# Patient Record
Sex: FEMALE | Race: WHITE | NOT HISPANIC OR LATINO | Employment: UNEMPLOYED | URBAN - METROPOLITAN AREA
[De-identification: names, ages, dates, MRNs, and addresses within clinical notes are randomized per-mention and may not be internally consistent; named-entity substitution may affect disease eponyms.]

---

## 2018-05-07 ENCOUNTER — HOSPITAL ENCOUNTER (EMERGENCY)
Facility: HOSPITAL | Age: 23
Discharge: HOME | End: 2018-05-07
Attending: EMERGENCY MEDICINE
Payer: MEDICAID

## 2018-05-07 VITALS
OXYGEN SATURATION: 98 % | SYSTOLIC BLOOD PRESSURE: 135 MMHG | HEIGHT: 67 IN | WEIGHT: 142 LBS | BODY MASS INDEX: 22.29 KG/M2 | DIASTOLIC BLOOD PRESSURE: 88 MMHG | TEMPERATURE: 98 F | HEART RATE: 81 BPM | RESPIRATION RATE: 18 BRPM

## 2018-05-07 DIAGNOSIS — T70.0XXA OTITIC BAROTRAUMA, INITIAL ENCOUNTER: Primary | ICD-10-CM

## 2018-05-07 PROCEDURE — 63600175 PHARM REV CODE 636 W HCPCS: Performed by: EMERGENCY MEDICINE

## 2018-05-07 PROCEDURE — 99283 EMERGENCY DEPT VISIT LOW MDM: CPT | Mod: 25

## 2018-05-07 PROCEDURE — 96372 THER/PROPH/DIAG INJ SC/IM: CPT

## 2018-05-07 PROCEDURE — 25000003 PHARM REV CODE 250: Performed by: EMERGENCY MEDICINE

## 2018-05-07 RX ORDER — DEXAMETHASONE SODIUM PHOSPHATE 100 MG/10ML
10 INJECTION INTRAMUSCULAR; INTRAVENOUS
Status: COMPLETED | OUTPATIENT
Start: 2018-05-07 | End: 2018-05-07

## 2018-05-07 RX ORDER — HYDROCODONE BITARTRATE AND ACETAMINOPHEN 5; 325 MG/1; MG/1
1 TABLET ORAL
Status: COMPLETED | OUTPATIENT
Start: 2018-05-07 | End: 2018-05-07

## 2018-05-07 RX ORDER — DEXAMETHASONE SODIUM PHOSPHATE 100 MG/10ML
4 INJECTION INTRAMUSCULAR; INTRAVENOUS
Status: DISCONTINUED | OUTPATIENT
Start: 2018-05-07 | End: 2018-05-07

## 2018-05-07 RX ORDER — PSEUDOEPHEDRINE HCL 30 MG
30-60 TABLET ORAL EVERY 6 HOURS PRN
Qty: 48 TABLET | Refills: 0 | Status: SHIPPED | OUTPATIENT
Start: 2018-05-07 | End: 2018-05-14

## 2018-05-07 RX ADMIN — DEXAMETHASONE SODIUM PHOSPHATE 10 MG: 10 INJECTION INTRAMUSCULAR; INTRAVENOUS at 05:05

## 2018-05-07 RX ADMIN — HYDROCODONE BITARTRATE AND ACETAMINOPHEN 1 TABLET: 5; 325 TABLET ORAL at 05:05

## 2018-05-07 NOTE — DISCHARGE INSTRUCTIONS
In ER   Dexamethasone 10mg IM x 1 / Norco x 1 in ER    Rx Sudafed  Over the counter Afrin nasal spray  Over the counter NSAID for pain     See Dr Davis in am for repeat evaluation

## 2018-05-08 NOTE — ED PROVIDER NOTES
Encounter Date: 5/7/2018       History     Chief Complaint   Patient presents with    Otalgia     bilateral, x1 hour     Bilateral ear pain without otorrhea following jumping from 20 ft into water while holding nose.     No prior ear abn            Review of patient's allergies indicates:  No Known Allergies  History reviewed. No pertinent past medical history.  Past Surgical History:   Procedure Laterality Date    abdominal lap      TUBAL LIGATION       History reviewed. No pertinent family history.  Social History   Substance Use Topics    Smoking status: Current Every Day Smoker    Smokeless tobacco: Not on file    Alcohol use No     Review of Systems   HENT: Positive for congestion, ear pain, hearing loss, rhinorrhea and sinus pressure. Negative for ear discharge, facial swelling, nosebleeds, postnasal drip, sinus pain, sore throat, tinnitus, trouble swallowing and voice change.    Skin: Negative.    Hematological: Negative for adenopathy.   All other systems reviewed and are negative.      Physical Exam     Initial Vitals [05/07/18 1639]   BP Pulse Resp Temp SpO2   135/88 81 18 98.1 °F (36.7 °C) 98 %      MAP       103.67         Physical Exam    Nursing note and vitals reviewed.  Constitutional: She appears well-developed and well-nourished. She is not diaphoretic. No distress.   HENT:   Right Ear: External ear normal. No drainage, swelling or tenderness. Tympanic membrane is not injected, not scarred, not perforated, not erythematous, not retracted and not bulging. There is hemotympanum. Decreased hearing is noted.   Left Ear: External ear normal. No drainage. Tympanic membrane is not injected, not scarred, not perforated, not erythematous, not retracted and not bulging. There is hemotympanum. Decreased hearing is noted.   Both TMs appear to have ecchymotic appearance without gross distortion of landmarks and no obvious perforation              ED Course   Procedures  Labs Reviewed - No data to  display          Medical Decision Making:   Otic barotrauma   Analgesics  Steroid IM  Oral decongestant  Nasal decongestant   F/u ENT in am for re-eval.                       Clinical Impression:   The encounter diagnosis was Otitic barotrauma, initial encounter.                           Sammy Braga MD  05/08/18 2075

## 2018-05-14 ENCOUNTER — LAB VISIT (OUTPATIENT)
Dept: LAB | Facility: HOSPITAL | Age: 23
End: 2018-05-14
Attending: OBSTETRICS & GYNECOLOGY
Payer: MEDICAID

## 2018-05-14 DIAGNOSIS — R10.2 ADNEXAL TENDERNESS, RIGHT: Primary | ICD-10-CM

## 2018-05-14 LAB
ABO + RH BLD: NORMAL
ANION GAP SERPL CALC-SCNC: 9 MMOL/L
APTT BLDCRRT: 24.8 SEC
B-HCG UR QL: NEGATIVE
BILIRUB UR QL STRIP: NEGATIVE
BLD GP AB SCN CELLS X3 SERPL QL: NORMAL
BUN SERPL-MCNC: 10 MG/DL
CALCIUM SERPL-MCNC: 9.1 MG/DL
CHLORIDE SERPL-SCNC: 103 MMOL/L
CLARITY UR: CLEAR
CO2 SERPL-SCNC: 24 MMOL/L
COLOR UR: YELLOW
CREAT SERPL-MCNC: 0.6 MG/DL
ERYTHROCYTE [DISTWIDTH] IN BLOOD BY AUTOMATED COUNT: 12.6 %
EST. GFR  (AFRICAN AMERICAN): >60 ML/MIN/1.73 M^2
EST. GFR  (NON AFRICAN AMERICAN): >60 ML/MIN/1.73 M^2
GLUCOSE SERPL-MCNC: 88 MG/DL
GLUCOSE UR QL STRIP: NEGATIVE
HCT VFR BLD AUTO: 40.4 %
HGB BLD-MCNC: 13.6 G/DL
HGB UR QL STRIP: NEGATIVE
INR PPP: 1.2
KETONES UR QL STRIP: NEGATIVE
LEUKOCYTE ESTERASE UR QL STRIP: NEGATIVE
MCH RBC QN AUTO: 29.2 PG
MCHC RBC AUTO-ENTMCNC: 33.7 G/DL
MCV RBC AUTO: 87 FL
NITRITE UR QL STRIP: NEGATIVE
PH UR STRIP: 6 [PH] (ref 5–8)
PLATELET # BLD AUTO: 236 K/UL
PMV BLD AUTO: 10.4 FL
POTASSIUM SERPL-SCNC: 3.8 MMOL/L
PROT UR QL STRIP: NEGATIVE
PROTHROMBIN TIME: 13.4 SEC
RBC # BLD AUTO: 4.66 M/UL
SODIUM SERPL-SCNC: 136 MMOL/L
SP GR UR STRIP: 1.02 (ref 1–1.03)
URN SPEC COLLECT METH UR: NORMAL
UROBILINOGEN UR STRIP-ACNC: NEGATIVE EU/DL
WBC # BLD AUTO: 11.23 K/UL

## 2018-05-14 PROCEDURE — 81003 URINALYSIS AUTO W/O SCOPE: CPT

## 2018-05-14 PROCEDURE — 85730 THROMBOPLASTIN TIME PARTIAL: CPT

## 2018-05-14 PROCEDURE — 81025 URINE PREGNANCY TEST: CPT

## 2018-05-14 PROCEDURE — 85610 PROTHROMBIN TIME: CPT

## 2018-05-14 PROCEDURE — 80048 BASIC METABOLIC PNL TOTAL CA: CPT

## 2018-05-14 PROCEDURE — 85027 COMPLETE CBC AUTOMATED: CPT

## 2018-05-14 PROCEDURE — 86901 BLOOD TYPING SEROLOGIC RH(D): CPT

## 2018-05-16 ENCOUNTER — HOSPITAL ENCOUNTER (OUTPATIENT)
Facility: HOSPITAL | Age: 23
Discharge: HOME | End: 2018-05-18
Attending: OBSTETRICS & GYNECOLOGY | Admitting: OBSTETRICS & GYNECOLOGY
Payer: MEDICAID

## 2018-05-16 ENCOUNTER — ANESTHESIA EVENT (OUTPATIENT)
Dept: SURGERY | Facility: HOSPITAL | Age: 23
End: 2018-05-16
Payer: MEDICAID

## 2018-05-16 ENCOUNTER — ANESTHESIA (OUTPATIENT)
Dept: SURGERY | Facility: HOSPITAL | Age: 23
End: 2018-05-16
Payer: MEDICAID

## 2018-05-16 DIAGNOSIS — R10.2 PELVIC PAIN: Primary | ICD-10-CM

## 2018-05-16 PROBLEM — N94.6 DYSMENORRHEA: Status: ACTIVE | Noted: 2018-05-16

## 2018-05-16 PROBLEM — N94.10 DYSPAREUNIA, FEMALE: Status: ACTIVE | Noted: 2018-05-16

## 2018-05-16 PROBLEM — F17.200 TOBACCO USE DISORDER: Status: ACTIVE | Noted: 2018-05-16

## 2018-05-16 PROBLEM — N92.1 MENOMETRORRHAGIA: Status: ACTIVE | Noted: 2018-05-16

## 2018-05-16 PROCEDURE — 00944 ANES VAG PX VAG HYSTERECTOMY: CPT | Performed by: OBSTETRICS & GYNECOLOGY

## 2018-05-16 PROCEDURE — 71000033 HC RECOVERY, INTIAL HOUR: Performed by: OBSTETRICS & GYNECOLOGY

## 2018-05-16 PROCEDURE — 96361 HYDRATE IV INFUSION ADD-ON: CPT | Performed by: OBSTETRICS & GYNECOLOGY

## 2018-05-16 PROCEDURE — 63600175 PHARM REV CODE 636 W HCPCS

## 2018-05-16 PROCEDURE — 37000008 HC ANESTHESIA 1ST 15 MINUTES: Performed by: OBSTETRICS & GYNECOLOGY

## 2018-05-16 PROCEDURE — 96361 HYDRATE IV INFUSION ADD-ON: CPT

## 2018-05-16 PROCEDURE — 37000009 HC ANESTHESIA EA ADD 15 MINS: Performed by: OBSTETRICS & GYNECOLOGY

## 2018-05-16 PROCEDURE — 96376 TX/PRO/DX INJ SAME DRUG ADON: CPT | Mod: 59

## 2018-05-16 PROCEDURE — 63600175 PHARM REV CODE 636 W HCPCS: Performed by: NURSE ANESTHETIST, CERTIFIED REGISTERED

## 2018-05-16 PROCEDURE — 96374 THER/PROPH/DIAG INJ IV PUSH: CPT | Mod: 59

## 2018-05-16 PROCEDURE — 25000003 PHARM REV CODE 250: Performed by: OBSTETRICS & GYNECOLOGY

## 2018-05-16 PROCEDURE — 88307 TISSUE EXAM BY PATHOLOGIST: CPT | Mod: 26,,, | Performed by: PATHOLOGY

## 2018-05-16 PROCEDURE — S4991 NICOTINE PATCH NONLEGEND: HCPCS | Performed by: OBSTETRICS & GYNECOLOGY

## 2018-05-16 PROCEDURE — 88307 TISSUE EXAM BY PATHOLOGIST: CPT | Performed by: PATHOLOGY

## 2018-05-16 PROCEDURE — 36000708 HC OR TIME LEV III 1ST 15 MIN: Performed by: OBSTETRICS & GYNECOLOGY

## 2018-05-16 PROCEDURE — 96376 TX/PRO/DX INJ SAME DRUG ADON: CPT | Performed by: OBSTETRICS & GYNECOLOGY

## 2018-05-16 PROCEDURE — 36000709 HC OR TIME LEV III EA ADD 15 MIN: Performed by: OBSTETRICS & GYNECOLOGY

## 2018-05-16 PROCEDURE — 63600175 PHARM REV CODE 636 W HCPCS: Performed by: OBSTETRICS & GYNECOLOGY

## 2018-05-16 PROCEDURE — G0378 HOSPITAL OBSERVATION PER HR: HCPCS

## 2018-05-16 PROCEDURE — 25000003 PHARM REV CODE 250: Performed by: NURSE ANESTHETIST, CERTIFIED REGISTERED

## 2018-05-16 PROCEDURE — 99900035 HC TECH TIME PER 15 MIN (STAT)

## 2018-05-16 PROCEDURE — 96375 TX/PRO/DX INJ NEW DRUG ADDON: CPT | Mod: 59

## 2018-05-16 PROCEDURE — 94799 UNLISTED PULMONARY SVC/PX: CPT

## 2018-05-16 PROCEDURE — 96374 THER/PROPH/DIAG INJ IV PUSH: CPT | Mod: 59 | Performed by: OBSTETRICS & GYNECOLOGY

## 2018-05-16 PROCEDURE — 63600175 PHARM REV CODE 636 W HCPCS: Performed by: ANESTHESIOLOGY

## 2018-05-16 PROCEDURE — 96375 TX/PRO/DX INJ NEW DRUG ADDON: CPT | Performed by: OBSTETRICS & GYNECOLOGY

## 2018-05-16 RX ORDER — MORPHINE SULFATE 4 MG/ML
2 INJECTION, SOLUTION INTRAMUSCULAR; INTRAVENOUS EVERY 5 MIN PRN
Status: DISCONTINUED | OUTPATIENT
Start: 2018-05-16 | End: 2018-05-16 | Stop reason: HOSPADM

## 2018-05-16 RX ORDER — MORPHINE SULFATE 4 MG/ML
INJECTION, SOLUTION INTRAMUSCULAR; INTRAVENOUS
Status: COMPLETED
Start: 2018-05-16 | End: 2018-05-16

## 2018-05-16 RX ORDER — GLYCOPYRROLATE 0.2 MG/ML
INJECTION INTRAMUSCULAR; INTRAVENOUS
Status: DISCONTINUED | OUTPATIENT
Start: 2018-05-16 | End: 2018-05-16

## 2018-05-16 RX ORDER — ONDANSETRON 2 MG/ML
4 INJECTION INTRAMUSCULAR; INTRAVENOUS DAILY PRN
Status: DISCONTINUED | OUTPATIENT
Start: 2018-05-16 | End: 2018-05-16 | Stop reason: HOSPADM

## 2018-05-16 RX ORDER — SODIUM CHLORIDE, SODIUM LACTATE, POTASSIUM CHLORIDE, CALCIUM CHLORIDE 600; 310; 30; 20 MG/100ML; MG/100ML; MG/100ML; MG/100ML
INJECTION, SOLUTION INTRAVENOUS CONTINUOUS
Status: DISCONTINUED | OUTPATIENT
Start: 2018-05-16 | End: 2018-05-18 | Stop reason: HOSPADM

## 2018-05-16 RX ORDER — CEFAZOLIN SODIUM 2 G/50ML
2 SOLUTION INTRAVENOUS
Status: COMPLETED | OUTPATIENT
Start: 2018-05-16 | End: 2018-05-16

## 2018-05-16 RX ORDER — URINARY ANTISEPTIC ANTISPASMODIC 81.6; 40.8; 10.8; .12 MG/1; MG/1; MG/1; MG/1
TABLET ORAL
Status: DISCONTINUED | OUTPATIENT
Start: 2018-05-16 | End: 2018-05-16 | Stop reason: HOSPADM

## 2018-05-16 RX ORDER — DIPHENHYDRAMINE HCL 25 MG
25 CAPSULE ORAL EVERY 4 HOURS PRN
Status: DISCONTINUED | OUTPATIENT
Start: 2018-05-16 | End: 2018-05-18 | Stop reason: HOSPADM

## 2018-05-16 RX ORDER — MORPHINE SULFATE 4 MG/ML
8 INJECTION, SOLUTION INTRAMUSCULAR; INTRAVENOUS
Status: DISCONTINUED | OUTPATIENT
Start: 2018-05-16 | End: 2018-05-16

## 2018-05-16 RX ORDER — MIDAZOLAM HYDROCHLORIDE 1 MG/ML
INJECTION, SOLUTION INTRAMUSCULAR; INTRAVENOUS
Status: DISCONTINUED | OUTPATIENT
Start: 2018-05-16 | End: 2018-05-16

## 2018-05-16 RX ORDER — DIAZEPAM 5 MG/1
10 TABLET ORAL EVERY 6 HOURS PRN
Status: DISCONTINUED | OUTPATIENT
Start: 2018-05-16 | End: 2018-05-18 | Stop reason: HOSPADM

## 2018-05-16 RX ORDER — KETOROLAC TROMETHAMINE 30 MG/ML
30 INJECTION, SOLUTION INTRAMUSCULAR; INTRAVENOUS EVERY 6 HOURS
Status: DISCONTINUED | OUTPATIENT
Start: 2018-05-16 | End: 2018-05-17

## 2018-05-16 RX ORDER — ONDANSETRON 2 MG/ML
INJECTION INTRAMUSCULAR; INTRAVENOUS
Status: DISCONTINUED | OUTPATIENT
Start: 2018-05-16 | End: 2018-05-16

## 2018-05-16 RX ORDER — ONDANSETRON 4 MG/1
8 TABLET, ORALLY DISINTEGRATING ORAL EVERY 8 HOURS PRN
Status: DISCONTINUED | OUTPATIENT
Start: 2018-05-16 | End: 2018-05-18 | Stop reason: HOSPADM

## 2018-05-16 RX ORDER — ENOXAPARIN SODIUM 100 MG/ML
40 INJECTION SUBCUTANEOUS EVERY 24 HOURS
Status: DISCONTINUED | OUTPATIENT
Start: 2018-05-17 | End: 2018-05-18 | Stop reason: HOSPADM

## 2018-05-16 RX ORDER — CISATRACURIUM BESYLATE 2 MG/ML
INJECTION, SOLUTION INTRAVENOUS
Status: DISCONTINUED | OUTPATIENT
Start: 2018-05-16 | End: 2018-05-16

## 2018-05-16 RX ORDER — PROPOFOL 10 MG/ML
VIAL (ML) INTRAVENOUS
Status: DISCONTINUED | OUTPATIENT
Start: 2018-05-16 | End: 2018-05-16

## 2018-05-16 RX ORDER — LIDOCAINE HYDROCHLORIDE 10 MG/ML
1 INJECTION, SOLUTION EPIDURAL; INFILTRATION; INTRACAUDAL; PERINEURAL ONCE
Status: DISCONTINUED | OUTPATIENT
Start: 2018-05-16 | End: 2018-05-16 | Stop reason: HOSPADM

## 2018-05-16 RX ORDER — BUPROPION HYDROCHLORIDE 100 MG/1
100 TABLET ORAL 2 TIMES DAILY
Status: DISCONTINUED | OUTPATIENT
Start: 2018-05-16 | End: 2018-05-18 | Stop reason: HOSPADM

## 2018-05-16 RX ORDER — DIPHENHYDRAMINE HYDROCHLORIDE 50 MG/ML
12.5 INJECTION INTRAMUSCULAR; INTRAVENOUS
Status: DISCONTINUED | OUTPATIENT
Start: 2018-05-16 | End: 2018-05-16 | Stop reason: HOSPADM

## 2018-05-16 RX ORDER — ONDANSETRON 2 MG/ML
INJECTION INTRAMUSCULAR; INTRAVENOUS
Status: COMPLETED
Start: 2018-05-16 | End: 2018-05-16

## 2018-05-16 RX ORDER — MORPHINE SULFATE 4 MG/ML
8 INJECTION, SOLUTION INTRAMUSCULAR; INTRAVENOUS EVERY 4 HOURS PRN
Status: DISCONTINUED | OUTPATIENT
Start: 2018-05-16 | End: 2018-05-18 | Stop reason: HOSPADM

## 2018-05-16 RX ORDER — SUCCINYLCHOLINE CHLORIDE 20 MG/ML
INJECTION INTRAMUSCULAR; INTRAVENOUS
Status: DISCONTINUED | OUTPATIENT
Start: 2018-05-16 | End: 2018-05-16

## 2018-05-16 RX ORDER — IBUPROFEN 200 MG
1 TABLET ORAL DAILY
Status: DISCONTINUED | OUTPATIENT
Start: 2018-05-16 | End: 2018-05-18 | Stop reason: HOSPADM

## 2018-05-16 RX ORDER — DEXAMETHASONE SODIUM PHOSPHATE 4 MG/ML
INJECTION, SOLUTION INTRA-ARTICULAR; INTRALESIONAL; INTRAMUSCULAR; INTRAVENOUS; SOFT TISSUE
Status: DISCONTINUED | OUTPATIENT
Start: 2018-05-16 | End: 2018-05-16

## 2018-05-16 RX ORDER — SIMETHICONE 80 MG
80 TABLET,CHEWABLE ORAL EVERY 4 HOURS PRN
Status: DISCONTINUED | OUTPATIENT
Start: 2018-05-16 | End: 2018-05-18 | Stop reason: HOSPADM

## 2018-05-16 RX ORDER — NEOSTIGMINE METHYLSULFATE 1 MG/ML
INJECTION, SOLUTION INTRAVENOUS
Status: DISCONTINUED | OUTPATIENT
Start: 2018-05-16 | End: 2018-05-16

## 2018-05-16 RX ORDER — MEPERIDINE HYDROCHLORIDE 50 MG/ML
INJECTION INTRAMUSCULAR; INTRAVENOUS; SUBCUTANEOUS
Status: DISCONTINUED | OUTPATIENT
Start: 2018-05-16 | End: 2018-05-16

## 2018-05-16 RX ADMIN — DEXAMETHASONE SODIUM PHOSPHATE 4 MG: 4 INJECTION, SOLUTION INTRAMUSCULAR; INTRAVENOUS at 08:05

## 2018-05-16 RX ADMIN — DIPHENHYDRAMINE HYDROCHLORIDE 25 MG: 25 CAPSULE ORAL at 11:05

## 2018-05-16 RX ADMIN — SODIUM CHLORIDE, POTASSIUM CHLORIDE, SODIUM LACTATE AND CALCIUM CHLORIDE: 600; 310; 30; 20 INJECTION, SOLUTION INTRAVENOUS at 11:05

## 2018-05-16 RX ADMIN — MORPHINE SULFATE 2 MG: 4 INJECTION INTRAVENOUS at 10:05

## 2018-05-16 RX ADMIN — SUCCINYLCHOLINE CHLORIDE 100 MG: 20 INJECTION, SOLUTION INTRAMUSCULAR; INTRAVENOUS at 08:05

## 2018-05-16 RX ADMIN — ONDANSETRON 4 MG: 2 INJECTION INTRAMUSCULAR; INTRAVENOUS at 10:05

## 2018-05-16 RX ADMIN — MORPHINE SULFATE 8 MG: 4 INJECTION INTRAVENOUS at 12:05

## 2018-05-16 RX ADMIN — GLYCOPYRROLATE 0.4 MG: 0.2 INJECTION INTRAMUSCULAR; INTRAVENOUS at 09:05

## 2018-05-16 RX ADMIN — BUPROPION HYDROCHLORIDE 100 MG: 100 TABLET, FILM COATED ORAL at 09:05

## 2018-05-16 RX ADMIN — MORPHINE SULFATE 8 MG: 4 INJECTION INTRAVENOUS at 03:05

## 2018-05-16 RX ADMIN — MEPERIDINE HYDROCHLORIDE 25 MG: 50 INJECTION INTRAMUSCULAR; INTRAVENOUS; SUBCUTANEOUS at 08:05

## 2018-05-16 RX ADMIN — SODIUM CHLORIDE, POTASSIUM CHLORIDE, SODIUM LACTATE AND CALCIUM CHLORIDE: 600; 310; 30; 20 INJECTION, SOLUTION INTRAVENOUS at 09:05

## 2018-05-16 RX ADMIN — NICOTINE 1 PATCH: 14 PATCH, EXTENDED RELEASE TRANSDERMAL at 11:05

## 2018-05-16 RX ADMIN — NEOSTIGMINE METHYLSULFATE 3 MG: 1 INJECTION INTRAVENOUS at 09:05

## 2018-05-16 RX ADMIN — SODIUM CHLORIDE, POTASSIUM CHLORIDE, SODIUM LACTATE AND CALCIUM CHLORIDE: 600; 310; 30; 20 INJECTION, SOLUTION INTRAVENOUS at 05:05

## 2018-05-16 RX ADMIN — ONDANSETRON 4 MG: 2 INJECTION INTRAMUSCULAR; INTRAVENOUS at 08:05

## 2018-05-16 RX ADMIN — SODIUM CHLORIDE, POTASSIUM CHLORIDE, SODIUM LACTATE AND CALCIUM CHLORIDE: 600; 310; 30; 20 INJECTION, SOLUTION INTRAVENOUS at 07:05

## 2018-05-16 RX ADMIN — KETOROLAC TROMETHAMINE 30 MG: 30 INJECTION, SOLUTION INTRAMUSCULAR; INTRAVENOUS at 11:05

## 2018-05-16 RX ADMIN — MORPHINE SULFATE 8 MG: 4 INJECTION INTRAVENOUS at 11:05

## 2018-05-16 RX ADMIN — CISATRACURIUM BESYLATE 4 MG: 2 INJECTION INTRAVENOUS at 08:05

## 2018-05-16 RX ADMIN — MORPHINE SULFATE 8 MG: 4 INJECTION INTRAVENOUS at 07:05

## 2018-05-16 RX ADMIN — PROPOFOL 70 MG: 10 INJECTION, EMULSION INTRAVENOUS at 09:05

## 2018-05-16 RX ADMIN — PROPOFOL 130 MG: 10 INJECTION, EMULSION INTRAVENOUS at 08:05

## 2018-05-16 RX ADMIN — KETOROLAC TROMETHAMINE 30 MG: 30 INJECTION, SOLUTION INTRAMUSCULAR; INTRAVENOUS at 06:05

## 2018-05-16 RX ADMIN — MIDAZOLAM HYDROCHLORIDE 2 MG: 1 INJECTION, SOLUTION INTRAMUSCULAR; INTRAVENOUS at 07:05

## 2018-05-16 RX ADMIN — CEFAZOLIN SODIUM 2000 MG: 2 SOLUTION INTRAVENOUS at 07:05

## 2018-05-16 RX ADMIN — BUPROPION HYDROCHLORIDE 100 MG: 100 TABLET, FILM COATED ORAL at 01:05

## 2018-05-16 RX ADMIN — MORPHINE SULFATE 8 MG: 4 INJECTION INTRAVENOUS at 02:05

## 2018-05-16 RX ADMIN — MEPERIDINE HYDROCHLORIDE 25 MG: 50 INJECTION INTRAMUSCULAR; INTRAVENOUS; SUBCUTANEOUS at 09:05

## 2018-05-16 NOTE — HPI
23 yo ..  menometro x 3 yr, worsening...btl .... dysp interuppts intercourse 8/10x.   Pelvic pain pelvic pressure, worse w activity.  dysm home bound 2d/mo....emb done wnl...

## 2018-05-16 NOTE — OP NOTE
DATE OF PROCEDURE:  05/16/2018.    SURGEON:  Woody Alex MD.    ANESTHESIOLOGIST:  Tutu.    ANESTHESIA:  General.    PREOPERATIVE DIAGNOSES:  Menorrhagia, dysmenorrhea, dyspareunia,  symptomatic uterine descensus, pelvic pain.    POSTOPERATIVE DIAGNOSES:  Menorrhagia, dysmenorrhea, dyspareunia,  symptomatic uterine descensus, pelvic pain.    PROCEDURE:  Total vaginal hysterectomy.    ESTIMATED BLOOD LOSS:  300 mL.    PROCEDURE IN DETAIL:  After appropriate consents were obtained, the patient  was taken to the Operating Room and given general anesthesia through  endotracheal intubation and placed in ankle stirrups, prepped and draped in  usual fashion for vaginal surgery.  Frankel was placed into the bladder.   Bladder was drained, 120 mL of dilute methylene blue was instilled into the  bladder.  The Frankel was clamped.  Examination under anesthesia revealed  first-degree prolapse.  No adnexal masses were palpated.  Weighted speculum  placed in the posterior vaginal vault, tumor tenaculum grasped the  anterior, posterior lip of the cervix.  A transcervical vaginal incision  was made anteriorly.  The anterior cul-de-sac was entered by sharp  dissection.  Bladder pillars were singly clamped, cut, and sutured with 0  Vicryl.  The vaginal tissue between the uterosacral ligaments, posterior to  the cervix was then grasped and incised.  Posterior cul-de-sac was entered.   Uterosacral ligaments were doubly clamped, cut and sutured with 0 Vicryl  transfixed with 0 Vicryl and held.  The cardinal ligaments were doubly  clamped, cut, and sutured with 0 Vicryl.  Uterine arteries were doubly  clamped, cut, and sutured with 0 Vicryl.  The cervix was amputated.  Uterus  was flipped in a Rahel fashion.  Uteroovarian ligaments were doubly  clamped, cut, and sutured with 0 Vicryl and transfixed with 0 Vicryl.   Specimen was removed.  Tubes and ovaries appeared normal.  Hemostasis was  noted.  The anterior peritoneum was sutured to  the anterior vaginal cuff  with 2-0 Vicryl interrupted.  Posterior vaginal cuff was then closed with 0  Vicryl in running locking fashion.  The anterior and posterior vaginal cuff  were then sutured with 0 Vicryl interrupted.  The uterosacral ligaments  were then tied together.  The patient was extubated and taken to Recovery  Room in good condition.  Lap, needle, sponge, instrument and needle count  were correct x2.        DY/IN dd: 05/16/2018 09:34:20 (CDT)   td: 05/16/2018 12:27:12 (CDT)  Doc ID #[id number]   Job ID #[id number]    CC:

## 2018-05-16 NOTE — H&P
[facility]  Obstetrics & Gynecology  History & Physical    Patient Name: Heide Dockery  MRN: [id number]  Admission Date: 2018  Primary Care Provider: Primary Doctor No    Subjective:     Chief Complaint/Reason for Admission:     History of Present Illness:  23 yo ..  menometro x 3 yr, worsening...btl .... dysp interuppts intercourse 8/10x.   Pelvic pain pelvic pressure, worse w activity.  dysm home bound 2d/mo....emb done wnl...    No new subjective & objective note has been filed under this hospital service since the last note was generated.    Assessment/Plan:   [facility]  No new Assessment & Plan notes have been filed under this hospital service since the last note was generated.  Service: Obstetrics and Gynecology      Woody Alex MD  Obstetrics & Gynecology  [facility]

## 2018-05-16 NOTE — OR NURSING
Report called to OB floor. Patient resting with eyes closed. VSS patient on room air. No drainage noted on karla pad.

## 2018-05-16 NOTE — ANESTHESIA POSTPROCEDURE EVALUATION
"Anesthesia Post Evaluation    Patient: Heide Dockery    Procedure(s) Performed: Procedure(s) (LRB):  HYSTERECTOMY TOTAL VAGINAL (N/A)    Final Anesthesia Type: general  Patient location during evaluation: PACU  Patient participation: Yes- Able to Participate  Level of consciousness: awake and awake and alert  Post-procedure vital signs: reviewed and stable  Pain management: adequate  Airway patency: patent  PONV status at discharge: No PONV  Anesthetic complications: no      Cardiovascular status: blood pressure returned to baseline  Respiratory status: unassisted and spontaneous ventilation  Hydration status: euvolemic  Follow-up not needed.        Visit Vitals  /87 (Patient Position: Lying)   Pulse 68   Temp 36.6 °C (97.8 °F) (Oral)   Resp 16   Ht 5' 6" (1.676 m)   Wt 63.5 kg (140 lb)   LMP 04/06/2018   SpO2 99%   BMI 22.60 kg/m²       Pain/Carola Score: Pain Assessment Performed: Yes (5/16/2018 10:30 AM)  Presence of Pain: non-verbal indicators present (5/16/2018 10:30 AM)  Pain Rating Prior to Med Admin: 10 (5/16/2018 10:38 AM)  Carola Score: 9 (5/16/2018 10:30 AM)      "

## 2018-05-16 NOTE — NURSING
Dr. Alex called to verify Morphine 8mg IV q1h based of nursing judgement. Order modified to 8mg IV q4h as per MD order. Will continue to monitor and assess.

## 2018-05-16 NOTE — SUBJECTIVE & OBJECTIVE
Obstetric History     No data available        History reviewed. No pertinent past medical history.  Past Surgical History:   Procedure Laterality Date    abdominal lap      TUBAL LIGATION         No prescriptions prior to admission.       Review of patient's allergies indicates:  No Known Allergies     Family History     Problem Relation (Age of Onset)    No Known Problems Mother, Sister, Brother        Social History Main Topics    Smoking status: Current Every Day Smoker     Packs/day: 0.25     Years: 10.00    Smokeless tobacco: Current User    Alcohol use Not on file    Drug use: Unknown    Sexual activity: Not on file     Review of Systems   All other systems reviewed and are negative.     Objective:     Vital Signs (Most Recent):  Temp: 98 °F (36.7 °C) (05/16/18 0712)  Pulse: 80 (05/16/18 0712)  Resp: 20 (05/16/18 0712)  BP: 118/72 (05/16/18 0712)  SpO2: 97 % (05/16/18 0712) Vital Signs (24h Range):  Temp:  [98 °F (36.7 °C)] 98 °F (36.7 °C)  Pulse:  [80] 80  Resp:  [20] 20  SpO2:  [97 %] 97 %  BP: (118)/(72) 118/72     Weight: 63.5 kg (140 lb)  Body mass index is 22.6 kg/m².    Patient's last menstrual period was 04/06/2018.    Physical Exam:    HENT:   Head: Normocephalic.    Eyes: EOM are normal.    Neck: Normal range of motion.    Cardiovascular: Normal rate, regular rhythm and normal heart sounds.     Pulmonary/Chest: Breath sounds normal.        Abdominal: Soft.     Genitourinary: Vagina normal. Uterus is tender. Cervix is normal. Right adnexum displays tenderness. Left adnexum displays tenderness. Labial bartholins normal.               Skin: Skin is warm.    Psychiatric: She has a normal mood and affect.       Laboratory:  I have personallly reviewed all pertinent lab results from the last 24 hours.    Diagnostic Results:  Labs: Reviewed  reviewed

## 2018-05-16 NOTE — PLAN OF CARE
0831AM-16FRENCH EGAN INSERTED AT START OF PROCEDURE FOLLOWING PREP. 25ML OF CLEAR YELLOW NOTED.ANDERSON RN

## 2018-05-16 NOTE — H&P
[facility]  Obstetrics & Gynecology  History & Physical    Patient Name: Heide Dockery  MRN: [id number]  Admission Date: 2018  Primary Care Provider: Primary Doctor No    Subjective:     Chief Complaint/Reason for Admission: pelvic pain x 3 yr    History of Present Illness:  21 yo ..  menometro x 3 yr, worsening...btl .... dysp interuppts intercourse 8/10x.   Pelvic pain pelvic pressure, worse w activity.  dysm home bound 2d/mo....emb done wnl...        Obstetric History     No data available        History reviewed. No pertinent past medical history.  Past Surgical History:   Procedure Laterality Date    abdominal lap      TUBAL LIGATION         No prescriptions prior to admission.       Review of patient's allergies indicates:  No Known Allergies     Family History     Problem Relation (Age of Onset)    No Known Problems Mother, Sister, Brother        Social History Main Topics    Smoking status: Current Every Day Smoker     Packs/day: 0.25     Years: 10.00    Smokeless tobacco: Current User    Alcohol use Not on file    Drug use: Unknown    Sexual activity: Not on file     Review of Systems   All other systems reviewed and are negative.     Objective:     Vital Signs (Most Recent):  Temp: 98 °F (36.7 °C) (18 0712)  Pulse: 80 (18 0712)  Resp: 20 (18 0712)  BP: 118/72 (18 0712)  SpO2: 97 % (18 0712) Vital Signs (24h Range):  Temp:  [98 °F (36.7 °C)] 98 °F (36.7 °C)  Pulse:  [80] 80  Resp:  [20] 20  SpO2:  [97 %] 97 %  BP: (118)/(72) 118/72     Weight: 63.5 kg (140 lb)  Body mass index is 22.6 kg/m².    Patient's last menstrual period was 2018.    Physical Exam:    HENT:   Head: Normocephalic.    Eyes: EOM are normal.    Neck: Normal range of motion.    Cardiovascular: Normal rate, regular rhythm and normal heart sounds.     Pulmonary/Chest: Breath sounds normal.        Abdominal: Soft.     Genitourinary: Vagina normal. Uterus  is tender. Cervix is normal. Right adnexum displays tenderness. Left adnexum displays tenderness. Labial bartholins normal.               Skin: Skin is warm.    Psychiatric: She has a normal mood and affect.       Laboratory:  I have personallly reviewed all pertinent lab results from the last 24 hours.    Diagnostic Results:  Labs: Reviewed  reviewed    Assessment/Plan:     Tvh. Risks benefits and alternatives discussed  No new Assessment & Plan notes have been filed under this hospital service since the last note was generated.  Service: Obstetrics and Gynecology      Woody Alex MD  Obstetrics & Gynecology  [facility]

## 2018-05-16 NOTE — ANESTHESIA PREPROCEDURE EVALUATION
05/16/2018  Heide Dockery is a 23 y.o., female.    Anesthesia Evaluation    I have reviewed the Patient Summary Reports.    I have reviewed the Nursing Notes.   I have reviewed the Medications.     Review of Systems  Anesthesia Hx:  No problems with previous Anesthesia  Neg history of prior surgery. Denies Family Hx of Anesthesia complications.   Denies Personal Hx of Anesthesia complications.   Hematology/Oncology:  Hematology Normal   Oncology Normal     EENT/Dental:EENT/Dental Normal   Cardiovascular:  Cardiovascular Normal     Pulmonary:  Pulmonary Normal    Renal/:  Renal/ Normal     Hepatic/GI:  Hepatic/GI Normal    Musculoskeletal:  Musculoskeletal Normal    Neurological:  Neurology Normal    Endocrine:  Endocrine Normal    Dermatological:  Skin Normal    Psych:  Psychiatric Normal           Physical Exam  General:  Well nourished    Airway/Jaw/Neck:  AIRWAY FINDINGS: Normal      Eyes/Ears/Nose:  EYES/EARS/NOSE FINDINGS: Normal   Dental:  DENTAL FINDINGS: Normal   Chest/Lungs:  Chest/Lungs Clear    Heart/Vascular:  Heart Findings: Normal Heart murmur: negative Vascular Findings: Normal    Abdomen:  Abdomen Findings: Normal    Musculoskeletal:  Musculoskeletal Findings: Normal   Skin:  Skin Findings: Normal    Mental Status:  Mental Status Findings: Normal        Anesthesia Plan  Type of Anesthesia, risks & benefits discussed:  Anesthesia Type:  general  Patient's Preference:   Intra-op Monitoring Plan: standard ASA monitors  Intra-op Monitoring Plan Comments:   Post Op Pain Control Plan:   Post Op Pain Control Plan Comments:   Induction:   IV  Beta Blocker:  Patient is not currently on a Beta-Blocker (No further documentation required).       Informed Consent: Patient understands risks and agrees with Anesthesia plan.  Questions answered. Anesthesia consent signed with patient.  ASA Score: 1      Day of Surgery Review of History & Physical:    H&P update referred to the provider.         Ready For Surgery From Anesthesia Perspective.

## 2018-05-16 NOTE — PLAN OF CARE
Patient resting quietly with no signs of distress noted. VSS on room air. Kalin and SCD hose in place. 16 Icelandic Frankel in place to bedside drainage with seal intact, secured to right upper thigh with device, tubing intact and patent. Bladder is soft and non distended. Urine is noted in tubing with blue color, urine in drainage bag clear and yellow. Nataliia pad in place with no drainage noted. Disposable underwear in place. Skin is pink, warm, and dry. PIV intact with no redness or swelling at site. IV fluids infusing to gravity at KVO. Will continue to monitor.

## 2018-05-16 NOTE — HOSPITAL COURSE
Time out 5/16 0730  dysp  Luiza   Pp dysm  dysp  tvh  Tob cessation  hct 40/31  norco valium colace pnv wellb 100 mg bid nicoderm x 6 w

## 2018-05-16 NOTE — TRANSFER OF CARE
"Anesthesia Transfer of Care Note    Patient: Heide Dockery    Procedure(s) Performed: Procedure(s) (LRB):  HYSTERECTOMY TOTAL VAGINAL (N/A)    Patient location: PACU    Anesthesia Type: general    Transport from OR: Transported from OR on room air with adequate spontaneous ventilation    Post pain: adequate analgesia    Post assessment: no apparent anesthetic complications and tolerated procedure well    Post vital signs: stable    Level of consciousness: awake, alert and oriented    Nausea/Vomiting: no nausea/vomiting    Complications: none    Transfer of care protocol was followed      Last vitals:   Visit Vitals  /72 (BP Location: Right arm, Patient Position: Lying)   Pulse 80   Temp 36.7 °C (98 °F) (Oral)   Resp 20   Ht 5' 6" (1.676 m)   Wt 63.5 kg (140 lb)   LMP 04/06/2018   SpO2 97%   BMI 22.60 kg/m²     "

## 2018-05-16 NOTE — H&P
[facility]  Obstetrics & Gynecology  History & Physical    Patient Name: Heide Dockery  MRN: [id number]  Admission Date: 2018  Primary Care Provider: Primary Doctor No    Subjective:     Chief Complaint/Reason for Admission:     History of Present Illness:  23 yo ..  menometro x 3 yr, worsening...btl .... dysp interuppts intercourse 8/10x.   Pelvic pain pelvic pressure, worse w activity.  dysm home bound 2d/mo....emb done wnl...    No new subjective & objective note has been filed under this hospital service since the last note was generated.    Assessment/Plan:     No new Assessment & Plan notes have been filed under this hospital service since the last note was generated.  Service: Obstetrics and Gynecology      Woody Alex MD  Obstetrics & Gynecology  [facility]

## 2018-05-16 NOTE — BRIEF OP NOTE
[facility]  Operative Note     SUMMARY     Surgery Date: 5/16/2018       Pre-op Diagnosis:  Dyspareunia in female [N94.10]  Pain pelvic [R10.2]    Post-op Diagnosis:  Post-Op Diagnosis Codes:     * Dyspareunia in female [N94.10]     * Pain pelvic [R10.2]    Procedure(s) (LRB):  HYSTERECTOMY TOTAL VAGINAL (N/A)    Surgeon(s) and Role:     * Woody Alex MD - Primary      Estimated Blood Loss: 300 mL    Anesthesia:  General    Description of the findings of the procedure:  Dyspareunia in female [N94.10]  Pain pelvic [R10.2]           Procedure:   [facility]  Brief Operative Note    SUMMARY     Surgery Date: 5/16/2018     Surgeon(s) and Role:     * Woody Alex MD - Primary    Assisting Surgeon: None    Pre-op Diagnosis:  Dyspareunia in female [N94.10]  Pain pelvic [R10.2]    Post-op Diagnosis:  Post-Op Diagnosis Codes:     * Dyspareunia in female [N94.10]     * Pain pelvic [R10.2]    Procedure(s) (LRB):  HYSTERECTOMY TOTAL VAGINAL (N/A)    Anesthesia: General    Description of Procedure: tvh    Description of the findings of the procedure: nl tubes and ovaries    Estimated Blood Loss: 300 mL         Specimens:   Specimen (12h ago through future)    None

## 2018-05-17 LAB
ANION GAP SERPL CALC-SCNC: 4 MMOL/L
BASOPHILS # BLD AUTO: 0.02 K/UL
BASOPHILS NFR BLD: 0.2 %
BUN SERPL-MCNC: 7 MG/DL
CALCIUM SERPL-MCNC: 8.5 MG/DL
CHLORIDE SERPL-SCNC: 104 MMOL/L
CO2 SERPL-SCNC: 28 MMOL/L
CREAT SERPL-MCNC: 0.7 MG/DL
DIFFERENTIAL METHOD: ABNORMAL
EOSINOPHIL # BLD AUTO: 0.1 K/UL
EOSINOPHIL NFR BLD: 0.4 %
ERYTHROCYTE [DISTWIDTH] IN BLOOD BY AUTOMATED COUNT: 12.2 %
EST. GFR  (AFRICAN AMERICAN): >60 ML/MIN/1.73 M^2
EST. GFR  (NON AFRICAN AMERICAN): >60 ML/MIN/1.73 M^2
GLUCOSE SERPL-MCNC: 91 MG/DL
HCT VFR BLD AUTO: 31.3 %
HGB BLD-MCNC: 10.3 G/DL
IMM GRANULOCYTES # BLD AUTO: 0.05 K/UL
IMM GRANULOCYTES NFR BLD AUTO: 0.4 %
LYMPHOCYTES # BLD AUTO: 2.3 K/UL
LYMPHOCYTES NFR BLD: 19.8 %
MCH RBC QN AUTO: 28.9 PG
MCHC RBC AUTO-ENTMCNC: 32.9 G/DL
MCV RBC AUTO: 88 FL
MONOCYTES # BLD AUTO: 1 K/UL
MONOCYTES NFR BLD: 8.1 %
NEUTROPHILS # BLD AUTO: 8.3 K/UL
NEUTROPHILS NFR BLD: 71.1 %
NRBC BLD-RTO: 0 /100 WBC
PLATELET # BLD AUTO: 177 K/UL
PMV BLD AUTO: 10.5 FL
POTASSIUM SERPL-SCNC: 3.6 MMOL/L
RBC # BLD AUTO: 3.56 M/UL
SODIUM SERPL-SCNC: 136 MMOL/L
WBC # BLD AUTO: 11.74 K/UL

## 2018-05-17 PROCEDURE — 96361 HYDRATE IV INFUSION ADD-ON: CPT | Performed by: OBSTETRICS & GYNECOLOGY

## 2018-05-17 PROCEDURE — 80048 BASIC METABOLIC PNL TOTAL CA: CPT

## 2018-05-17 PROCEDURE — 96374 THER/PROPH/DIAG INJ IV PUSH: CPT

## 2018-05-17 PROCEDURE — 96376 TX/PRO/DX INJ SAME DRUG ADON: CPT

## 2018-05-17 PROCEDURE — 96376 TX/PRO/DX INJ SAME DRUG ADON: CPT | Performed by: OBSTETRICS & GYNECOLOGY

## 2018-05-17 PROCEDURE — 36415 COLL VENOUS BLD VENIPUNCTURE: CPT

## 2018-05-17 PROCEDURE — 96372 THER/PROPH/DIAG INJ SC/IM: CPT | Performed by: OBSTETRICS & GYNECOLOGY

## 2018-05-17 PROCEDURE — 96375 TX/PRO/DX INJ NEW DRUG ADDON: CPT

## 2018-05-17 PROCEDURE — G0378 HOSPITAL OBSERVATION PER HR: HCPCS

## 2018-05-17 PROCEDURE — 25000003 PHARM REV CODE 250: Performed by: OBSTETRICS & GYNECOLOGY

## 2018-05-17 PROCEDURE — 96361 HYDRATE IV INFUSION ADD-ON: CPT

## 2018-05-17 PROCEDURE — 85025 COMPLETE CBC W/AUTO DIFF WBC: CPT

## 2018-05-17 PROCEDURE — 63600175 PHARM REV CODE 636 W HCPCS: Performed by: OBSTETRICS & GYNECOLOGY

## 2018-05-17 PROCEDURE — 96372 THER/PROPH/DIAG INJ SC/IM: CPT

## 2018-05-17 PROCEDURE — S4991 NICOTINE PATCH NONLEGEND: HCPCS | Performed by: OBSTETRICS & GYNECOLOGY

## 2018-05-17 RX ORDER — OXYCODONE AND ACETAMINOPHEN 10; 325 MG/1; MG/1
1 TABLET ORAL EVERY 4 HOURS PRN
Status: DISCONTINUED | OUTPATIENT
Start: 2018-05-17 | End: 2018-05-18 | Stop reason: HOSPADM

## 2018-05-17 RX ORDER — DOCUSATE SODIUM 100 MG/1
200 CAPSULE, LIQUID FILLED ORAL DAILY PRN
Status: DISCONTINUED | OUTPATIENT
Start: 2018-05-17 | End: 2018-05-18 | Stop reason: HOSPADM

## 2018-05-17 RX ORDER — IBUPROFEN 200 MG
1 TABLET ORAL ONCE
Status: DISCONTINUED | OUTPATIENT
Start: 2018-05-17 | End: 2018-05-17

## 2018-05-17 RX ADMIN — SIMETHICONE CHEW TAB 80 MG 80 MG: 80 TABLET ORAL at 08:05

## 2018-05-17 RX ADMIN — DIPHENHYDRAMINE HYDROCHLORIDE 25 MG: 25 CAPSULE ORAL at 09:05

## 2018-05-17 RX ADMIN — DIAZEPAM 10 MG: 5 TABLET ORAL at 07:05

## 2018-05-17 RX ADMIN — BUPROPION HYDROCHLORIDE 100 MG: 100 TABLET, FILM COATED ORAL at 08:05

## 2018-05-17 RX ADMIN — NICOTINE 1 PATCH: 14 PATCH, EXTENDED RELEASE TRANSDERMAL at 09:05

## 2018-05-17 RX ADMIN — OXYCODONE AND ACETAMINOPHEN 1 TABLET: 10; 325 TABLET ORAL at 12:05

## 2018-05-17 RX ADMIN — BUPROPION HYDROCHLORIDE 100 MG: 100 TABLET, FILM COATED ORAL at 09:05

## 2018-05-17 RX ADMIN — DIPHENHYDRAMINE HYDROCHLORIDE 25 MG: 25 CAPSULE ORAL at 04:05

## 2018-05-17 RX ADMIN — OXYCODONE AND ACETAMINOPHEN 1 TABLET: 10; 325 TABLET ORAL at 03:05

## 2018-05-17 RX ADMIN — KETOROLAC TROMETHAMINE 30 MG: 30 INJECTION, SOLUTION INTRAMUSCULAR; INTRAVENOUS at 06:05

## 2018-05-17 RX ADMIN — SIMETHICONE CHEW TAB 80 MG 80 MG: 80 TABLET ORAL at 03:05

## 2018-05-17 RX ADMIN — ENOXAPARIN SODIUM 40 MG: 100 INJECTION SUBCUTANEOUS at 04:05

## 2018-05-17 RX ADMIN — MORPHINE SULFATE 8 MG: 4 INJECTION INTRAVENOUS at 04:05

## 2018-05-17 RX ADMIN — DOCUSATE SODIUM 200 MG: 100 CAPSULE, LIQUID FILLED ORAL at 09:05

## 2018-05-17 RX ADMIN — MORPHINE SULFATE 8 MG: 4 INJECTION INTRAVENOUS at 08:05

## 2018-05-17 RX ADMIN — OXYCODONE AND ACETAMINOPHEN 1 TABLET: 10; 325 TABLET ORAL at 07:05

## 2018-05-17 NOTE — PROGRESS NOTES
[facility]  Obstetrics & Gynecology  Progress Note    Patient Name: Heide Dockery  MRN: [id number]  Admission Date: 2018  Primary Care Provider: Primary Doctor No  Principal Problem: Pelvic pain    Subjective:     HPI:  23 yo ..  menometro x 3 yr, worsening...btl .... dysp interuppts intercourse 8/10x.   Pelvic pain pelvic pressure, worse w activity.  dysm home bound 2d/mo....emb done wnl...    Interval History: passing flatus    Scheduled Meds:   buPROPion  100 mg Oral BID    enoxaparin  40 mg Subcutaneous Daily    ketorolac  30 mg Intravenous Q6H    nicotine  1 patch Transdermal Daily     Continuous Infusions:   lactated ringers 125 mL/hr at 1836    lactated ringers 125 mL/hr at 18 2328     PRN Meds:diazePAM, diphenhydrAMINE, morphine, ondansetron, promethazine (PHENERGAN) IVPB, simethicone    Review of patient's allergies indicates:  No Known Allergies    Objective:     Vital Signs (Most Recent):  Temp: 98.3 °F (36.8 °C) (18)  Pulse: 69 (18)  Resp: 18 (18)  BP: (!) 102/55 (18)  SpO2: 99 % (18) Vital Signs (24h Range):  Temp:  [97.2 °F (36.2 °C)-98.3 °F (36.8 °C)] 98.3 °F (36.8 °C)  Pulse:  [62-84] 69  Resp:  [11-20] 18  SpO2:  [97 %-100 %] 99 %  BP: (101-133)/(55-90) 102/55     Weight: 63.5 kg (140 lb)  Body mass index is 22.6 kg/m².  Patient's last menstrual period was 2018.    I&O (Last 24H):    Intake/Output Summary (Last 24 hours) at 18 0832  Last data filed at 18 0600   Gross per 24 hour   Intake             5085 ml   Output             2900 ml   Net             2185 ml       Physical Exam:   Constitutional: She is oriented to person, place, and time. She appears well-developed and well-nourished.    HENT:   Head: Normocephalic and atraumatic.    Eyes: EOM are normal. Pupils are equal, round, and reactive to light.    Neck: Normal range of motion.    Cardiovascular: Normal  rate, regular rhythm and normal heart sounds.     Pulmonary/Chest: Effort normal and breath sounds normal.        Abdominal: Soft. Bowel sounds are normal.             Musculoskeletal: Normal range of motion.       Neurological: She is alert and oriented to person, place, and time.         Laboratory:  I have personallly reviewed all pertinent lab results from the last 24 hours.    Diagnostic Results:  Labs: Reviewed  reviewed    Assessment/Plan:     No notes have been filed under this hospital service.  Service: Obstetrics and Gynecology      Woody Alex MD  Obstetrics & Gynecology  [facility]

## 2018-05-17 NOTE — SUBJECTIVE & OBJECTIVE
Interval History: passing flatus    Scheduled Meds:   buPROPion  100 mg Oral BID    enoxaparin  40 mg Subcutaneous Daily    ketorolac  30 mg Intravenous Q6H    nicotine  1 patch Transdermal Daily     Continuous Infusions:   lactated ringers 125 mL/hr at 05/16/18 0736    lactated ringers 125 mL/hr at 05/16/18 2328     PRN Meds:diazePAM, diphenhydrAMINE, morphine, ondansetron, promethazine (PHENERGAN) IVPB, simethicone    Review of patient's allergies indicates:  No Known Allergies    Objective:     Vital Signs (Most Recent):  Temp: 98.3 °F (36.8 °C) (05/17/18 0731)  Pulse: 69 (05/17/18 0731)  Resp: 18 (05/17/18 0731)  BP: (!) 102/55 (05/17/18 0731)  SpO2: 99 % (05/17/18 0731) Vital Signs (24h Range):  Temp:  [97.2 °F (36.2 °C)-98.3 °F (36.8 °C)] 98.3 °F (36.8 °C)  Pulse:  [62-84] 69  Resp:  [11-20] 18  SpO2:  [97 %-100 %] 99 %  BP: (101-133)/(55-90) 102/55     Weight: 63.5 kg (140 lb)  Body mass index is 22.6 kg/m².  Patient's last menstrual period was 04/06/2018.    I&O (Last 24H):    Intake/Output Summary (Last 24 hours) at 05/17/18 0832  Last data filed at 05/17/18 0600   Gross per 24 hour   Intake             5085 ml   Output             2900 ml   Net             2185 ml       Physical Exam:   Constitutional: She is oriented to person, place, and time. She appears well-developed and well-nourished.    HENT:   Head: Normocephalic and atraumatic.    Eyes: EOM are normal. Pupils are equal, round, and reactive to light.    Neck: Normal range of motion.    Cardiovascular: Normal rate, regular rhythm and normal heart sounds.     Pulmonary/Chest: Effort normal and breath sounds normal.        Abdominal: Soft. Bowel sounds are normal.             Musculoskeletal: Normal range of motion.       Neurological: She is alert and oriented to person, place, and time.         Laboratory:  I have personallly reviewed all pertinent lab results from the last 24 hours.    Diagnostic Results:  Labs: Reviewed  reviewed

## 2018-05-17 NOTE — NURSING
"Sitting up in bed talking to multiple family members at bedside. Stated, "I took a really good nap. Now I am hungry". Lunch tray warmed. No distress noted at this time.   "

## 2018-05-17 NOTE — NURSING
SCD DEVICE REMOVED. EGAN CATH DC'ED PER POLICY. ADVISED PT CALL BEFORE SHE GETS UP TO BATHROOM. PROVIDED NO SLIP SOCKS, PT VERBALIZED UNDERSTANDING.

## 2018-05-17 NOTE — PLAN OF CARE
05/17/18 1030   Discharge Assessment   Assessment Type Discharge Planning Assessment   Confirmed/corrected address and phone number on facesheet? Yes   Assessment information obtained from? Patient   Expected Length of Stay (days) 2   Communicated expected length of stay with patient/caregiver yes   Prior to hospitilization cognitive status: Alert/Oriented   Prior to hospitalization functional status: Independent   Current cognitive status: Alert/Oriented   Current Functional Status: Independent   Lives With child(melissa), dependent   Able to Return to Prior Arrangements yes   Is patient able to care for self after discharge? Yes   Who are your caregiver(s) and their phone number(s)? Stephanie Posada mother [phone]   Patient's perception of discharge disposition home or selfcare   Readmission Within The Last 30 Days no previous admission in last 30 days   Patient currently being followed by outpatient case management? No   Patient currently receives any other outside agency services? No   Equipment Currently Used at Home none   Do you have any problems affording any of your prescribed medications? No   Is the patient taking medications as prescribed? (not on any medications at this time)   Does the patient have transportation home? Yes   Transportation Available family or friend will provide   Does the patient receive services at the [facility]? No   Discharge Plan A Home   Patient/Family In Agreement With Plan yes   Patient lives at home with her 3 children all under age of 4. She has a boyfriend & her mom helps her out. She denies any discharge needs.

## 2018-05-18 PROCEDURE — G0378 HOSPITAL OBSERVATION PER HR: HCPCS

## 2018-05-18 PROCEDURE — S4991 NICOTINE PATCH NONLEGEND: HCPCS | Performed by: OBSTETRICS & GYNECOLOGY

## 2018-05-18 PROCEDURE — 25000003 PHARM REV CODE 250: Performed by: OBSTETRICS & GYNECOLOGY

## 2018-05-18 RX ORDER — DIAZEPAM 10 MG/1
10 TABLET ORAL EVERY 8 HOURS PRN
Qty: 30 TABLET | Refills: 0 | Status: SHIPPED | OUTPATIENT
Start: 2018-05-18 | End: 2019-09-06

## 2018-05-18 RX ORDER — OXYCODONE AND ACETAMINOPHEN 10; 325 MG/1; MG/1
1 TABLET ORAL EVERY 6 HOURS PRN
Qty: 30 TABLET | Refills: 0 | Status: SHIPPED | OUTPATIENT
Start: 2018-05-18 | End: 2019-09-06

## 2018-05-18 RX ORDER — IBUPROFEN 200 MG
1 TABLET ORAL DAILY
Refills: 0 | COMMUNITY
Start: 2018-05-18 | End: 2019-09-06

## 2018-05-18 RX ORDER — BUPROPION HYDROCHLORIDE 100 MG/1
100 TABLET ORAL 2 TIMES DAILY
Qty: 60 TABLET | Refills: 11 | Status: SHIPPED | OUTPATIENT
Start: 2018-05-18 | End: 2019-09-06

## 2018-05-18 RX ORDER — DOCUSATE SODIUM 100 MG/1
200 CAPSULE, LIQUID FILLED ORAL DAILY PRN
Refills: 0 | COMMUNITY
Start: 2018-05-18 | End: 2019-09-06

## 2018-05-18 RX ADMIN — NICOTINE 1 PATCH: 14 PATCH, EXTENDED RELEASE TRANSDERMAL at 09:05

## 2018-05-18 RX ADMIN — OXYCODONE AND ACETAMINOPHEN 1 TABLET: 10; 325 TABLET ORAL at 12:05

## 2018-05-18 RX ADMIN — BUPROPION HYDROCHLORIDE 100 MG: 100 TABLET, FILM COATED ORAL at 09:05

## 2018-05-18 RX ADMIN — OXYCODONE AND ACETAMINOPHEN 1 TABLET: 10; 325 TABLET ORAL at 09:05

## 2018-05-18 RX ADMIN — OXYCODONE AND ACETAMINOPHEN 1 TABLET: 10; 325 TABLET ORAL at 05:05

## 2018-05-18 NOTE — DISCHARGE SUMMARY
[facility] - Post Partum  Obstetrics & Gynecology  Discharge Summary    Patient Name: Heide Dockery  MRN: [id number]  Admission Date: 2018  Hospital Length of Stay: 0 days  Discharge Date and Time:  2018 8:21 AM  Attending Physician: Woody Alex MD   Discharging Provider: Woody Alex MD  Primary Care Provider: Primary Doctor No    HPI:  23 yo ..  menometro x 3 yr, worsening...btl .... dysp interuppts intercourse 8/10x.   Pelvic pain pelvic pressure, worse w activity.  dysm home bound 2d/mo....emb done wnl...    Hospital Course:  Time out  0730  dysp  Luiza   Pp dysm  dysp  tvh  Tob cessation  hct   norco valium colace pnv wellb 100 mg bid nicoderm x 6 w    Procedure(s) (LRB):  HYSTERECTOMY TOTAL VAGINAL (N/A)         Significant Diagnostic Studies: Labs: All labs within the past 24 hours have been reviewed    Pending Diagnostic Studies:     None        Final Active Diagnoses:    Diagnosis Date Noted POA    PRINCIPAL PROBLEM:  Pelvic pain [R10.2] 2018 Yes    Menometrorrhagia [N92.1] 2018 Unknown    Dysmenorrhea [N94.6] 2018 Unknown    Dyspareunia, female [N94.10] 2018 Unknown    Tobacco use disorder [F17.200] 2018 Yes      Problems Resolved During this Admission:    Diagnosis Date Noted Date Resolved POA        Discharged Condition: good    Disposition: Home or Self Care    Follow Up:  Follow-up Information     Woody Alex MD In 1 week.    Specialty:  Obstetrics and Gynecology  Contact information:  [address] [place] [address] [phone]                 Patient Instructions:     Diet Adult Regular     Activity as tolerated     Lifting restrictions   Order Comments: Lift less than 20 lbs     Other restrictions (specify):   Order Comments: No sex x 6 wks     Notify your health care provider if you experience any of the following:  temperature >100.4     Notify your health care provider if you  experience any of the following:  severe uncontrolled pain     Notify your health care provider if you experience any of the following:  redness, tenderness, or signs of infection (pain, swelling, redness, odor or green/yellow discharge around incision site)     Notify your health care provider if you experience any of the following:  difficulty breathing or increased cough     Notify your health care provider if you experience any of the following:  severe persistent headache     Notify your health care provider if you experience any of the following:   Order Comments: Increased pain     Change dressing (specify)   Order Comments: If aquasel dressing, do not remove dressing. And you may shower with it on.  It will be removed in the office at one week.       Medications:  Reconciled Home Medications:      Medication List      START taking these medications    buPROPion 100 MG tablet  Commonly known as:  WELLBUTRIN  Take 1 tablet (100 mg total) by mouth 2 (two) times daily.     diazePAM 10 MG Tab  Commonly known as:  VALIUM  Take 1 tablet (10 mg total) by mouth every 8 (eight) hours as needed for Anxiety.     docusate sodium 100 MG capsule  Commonly known as:  COLACE  Take 2 capsules (200 mg total) by mouth daily as needed for Constipation.     nicotine 14 mg/24 hr  Commonly known as:  NICODERM CQ  Place 1 patch onto the skin once daily.     oxyCODONE-acetaminophen  mg per tablet  Commonly known as:  PERCOCET  Take 1 tablet by mouth every 6 (six) hours as needed.            Woody Alex MD  Obstetrics & Gynecology  [facility] - [facility] Partum

## 2018-05-18 NOTE — PLAN OF CARE
05/18/18 0836   Final Note   Assessment Type Final Discharge Note   Discharge Disposition Home   What phone number can be called within the next 1-3 days to see how you are doing after discharge? [phone]   Hospital Follow Up  Appt(s) scheduled? Yes   Discharge plans and expectations educations in teach back method with documentation complete? Yes   Follow up appointment given to patient. Denies any discharge needs.

## 2018-05-18 NOTE — NURSING
OBSERVED PT SLEEPING SOUNDLY WITH NORMAL RESP. AWAKENED FOR VITAL SIGNS, PT STATES PAIN IS 8/10. STATES WILL TAKE A PAIN PILL IF IT HAS BEEN TIME ENOUGH. PERCOCET GIVEN PO.

## 2018-05-18 NOTE — ED NOTES
PT IN BED IN ROOM 147, SIG. OTHER ALSO IN BED WITH HER. STATES STILL HAVING PAIN. LAYING QUIETLY WATCHING TV. OFFERED BENADRYL FOR REST, AGREEABLE. STATES FEELS CONSTIPATED, GAVE DOCUSATE. SIDERAILS UP CALL LIGHT IN REACH.

## 2018-06-11 VITALS
TEMPERATURE: 98 F | DIASTOLIC BLOOD PRESSURE: 59 MMHG | OXYGEN SATURATION: 98 % | HEIGHT: 66 IN | RESPIRATION RATE: 18 BRPM | BODY MASS INDEX: 22.5 KG/M2 | WEIGHT: 140 LBS | HEART RATE: 94 BPM | SYSTOLIC BLOOD PRESSURE: 128 MMHG

## 2019-04-04 DIAGNOSIS — S69.80XA: Primary | ICD-10-CM

## 2019-09-06 ENCOUNTER — OFFICE VISIT (OUTPATIENT)
Dept: SURGERY | Facility: CLINIC | Age: 24
End: 2019-09-06
Payer: MEDICAID

## 2019-09-06 VITALS
HEART RATE: 74 BPM | OXYGEN SATURATION: 100 % | SYSTOLIC BLOOD PRESSURE: 120 MMHG | HEIGHT: 66 IN | TEMPERATURE: 97 F | WEIGHT: 124.13 LBS | DIASTOLIC BLOOD PRESSURE: 61 MMHG | RESPIRATION RATE: 18 BRPM | BODY MASS INDEX: 19.95 KG/M2

## 2019-09-06 DIAGNOSIS — Z01.818 PRE-OP TESTING: ICD-10-CM

## 2019-09-06 DIAGNOSIS — R63.4 WEIGHT LOSS: Primary | ICD-10-CM

## 2019-09-06 DIAGNOSIS — L05.91 PILONIDAL CYST: ICD-10-CM

## 2019-09-06 PROCEDURE — 99204 OFFICE O/P NEW MOD 45 MIN: CPT | Mod: S$GLB,,, | Performed by: SURGERY

## 2019-09-06 PROCEDURE — 99204 PR OFFICE/OUTPT VISIT, NEW, LEVL IV, 45-59 MIN: ICD-10-PCS | Mod: S$GLB,,, | Performed by: SURGERY

## 2019-09-06 RX ORDER — LIDOCAINE HYDROCHLORIDE 10 MG/ML
1 INJECTION, SOLUTION EPIDURAL; INFILTRATION; INTRACAUDAL; PERINEURAL ONCE
Status: DISCONTINUED | OUTPATIENT
Start: 2019-09-06 | End: 2019-09-24 | Stop reason: HOSPADM

## 2019-09-06 RX ORDER — SODIUM CHLORIDE 9 MG/ML
INJECTION, SOLUTION INTRAVENOUS CONTINUOUS
Status: CANCELLED | OUTPATIENT
Start: 2019-09-06

## 2019-09-06 NOTE — LETTER
September 6, 2019      Tameka Dogulas, NP-C  [address]  [facility] [place] [address]           [facility] - General Surgery  [address] [facility] [place] [address]  Phone: [phone]  Fax: [phone]          Patient: Heide Dockery   MR Number: [id number]   YOB: 1995   Date of Visit: 9/6/2019       Dear Tameka Douglas:    Thank you for referring Heide Dockery to me for evaluation. Attached you will find relevant portions of my assessment and plan of care.    If you have questions, please do not hesitate to call me. I look forward to following Heide Dockery along with you.    Sincerely,    Dmitry Segura MD    Enclosure  CC:  No Recipients    If you would like to receive this communication electronically, please contact [email or web address] or [phone] to request more information on [facility] access.    For providers and/or their staff who would like to refer a patient to [facility], please contact us through our one-stop-shop provider referral line, [facility], at [phone].    If you feel you have received this communication in error or would no longer like to receive these types of communications, please e-mail [email or web address]

## 2019-09-06 NOTE — H&P
[facility] Surgery H&P Note    Subjective:       Patient ID: Heide Dockery is a 24 y.o. female.    Chief Complaint: Consult (Fssxiviv-Hxlcj-Lpaspwbay cyst/EGD)    HPI:  Heide Dockery is a 24 y.o. female with history of hysterectomy tubal ligation presents today as a new patient referral from Dr. Alex for evaluation of weight loss and pilonidal cyst.  Patient states that she has had this pilonidal cyst for some time, since May.  It hurts.  No history of infection or inflammation.  Patient also with separate issue of weight loss.  She states that she has lost 15 lb since December.  It is related to the patient not wanting to eat.  Nausea but no vomiting.  She feels like she has full.  She eats a couple of bites in the gets completely full.  She does not will more.  She does not eat very often given she does not have an appetite.  No blood in the stool.  No other signs or symptoms of issues ongoing.  Patient now presents today as a new patient referral for evaluation above.    No past medical history on file.  Past Surgical History:   Procedure Laterality Date    abdominal lap      HYSTERECTOMY      HYSTERECTOMY TOTAL VAGINAL N/A 5/16/2018    [facility]    TUBAL LIGATION       Family History   Problem Relation Age of Onset    No Known Problems Mother     No Known Problems Sister     No Known Problems Brother     Breast cancer Maternal Grandmother      Social History     Socioeconomic History    Marital status:      Spouse name: Not on file    Number of children: Not on file    Years of education: Not on file    Highest education level: Not on file   Occupational History    Not on file   Social Needs    Financial resource strain: Not on file    Food insecurity:     Worry: Not on file     Inability: Not on file    Transportation needs:     Medical: Not on file     Non-medical: Not on file   Tobacco Use    Smoking status: Current Every Day Smoker      Packs/day: 0.25     Years: 10.00     Pack years: 2.50    Smokeless tobacco: Current User   Substance and Sexual Activity    Alcohol use: Not on file    Drug use: Not on file    Sexual activity: Not on file   Lifestyle    Physical activity:     Days per week: Not on file     Minutes per session: Not on file    Stress: Not on file   Relationships    Social connections:     Talks on phone: Not on file     Gets together: Not on file     Attends Shinto service: Not on file     Active member of club or organization: Not on file     Attends meetings of clubs or organizations: Not on file     Relationship status: Not on file   Other Topics Concern    Not on file   Social History Narrative    Not on file       No current outpatient medications on file.     No current facility-administered medications for this visit.      Review of patient's allergies indicates:  No Known Allergies    Review of Systems   Constitutional: Negative for activity change, appetite change, chills and fever.   HENT: Negative for congestion, dental problem and ear discharge.    Eyes: Negative for discharge and itching.   Respiratory: Negative for apnea, choking and chest tightness.    Cardiovascular: Negative for chest pain and leg swelling.   Gastrointestinal: Negative for abdominal distention, abdominal pain, anal bleeding, constipation, diarrhea and nausea.   Endocrine: Negative for cold intolerance and heat intolerance.   Genitourinary: Negative for difficulty urinating and dyspareunia.   Musculoskeletal: Negative for arthralgias and back pain.   Skin: Negative for color change and pallor.   Neurological: Negative for dizziness and facial asymmetry.   Hematological: Negative for adenopathy. Does not bruise/bleed easily.   Psychiatric/Behavioral: Negative for agitation and behavioral problems.       Objective:      Vitals:    09/06/19 1035   BP: 120/61   Pulse: 74   Resp: 18   Temp: 96.8 °F (36 °C)   TempSrc: Oral   SpO2: 100%   Weight:  "56.3 kg (124 lb 1.9 oz)   Height: 5' 6" (1.676 m)     Physical Exam   Constitutional: She is oriented to person, place, and time. She appears well-developed and well-nourished. No distress.   HENT:   Head: Normocephalic and atraumatic.   Eyes: Pupils are equal, round, and reactive to light. EOM are normal.   Neck: Normal range of motion. Neck supple. No thyromegaly present.   Cardiovascular: Normal rate and regular rhythm.   Pulmonary/Chest: Effort normal and breath sounds normal.   Abdominal: Soft. Bowel sounds are normal. She exhibits no distension. There is no tenderness.   Musculoskeletal: Normal range of motion. She exhibits no edema or deformity.        Back:    Neurological: She is alert and oriented to person, place, and time. No cranial nerve deficit.   Skin: Skin is warm. Capillary refill takes less than 2 seconds. No erythema.   Psychiatric: She has a normal mood and affect. Her behavior is normal.        Assessment:       1. Weight loss    2. Pre-op testing    3. Pilonidal cyst        Plan:   Weight loss  -     US Abdomen Limited; Future; Expected date: 09/06/2019  -     NM Gastric Emptying; Future; Expected date: 09/06/2019    Pre-op testing  -     CBC auto differential; Future; Expected date: 12/06/2019  -     Comprehensive metabolic panel; Future; Expected date: 12/06/2019    Pilonidal cyst        Medical Decision Making/Counseling:  Patient with weight loss, intolerance of food, nausea, epigastric pain. Some it symptomatology differential diagnosis in the patient's age group suggest peptic ulcer disease, gastric paresis, biliary colic, etc.  Ultimately I believe the next best steps in this patient's workup will be for EGD for ruling out peptic ulcer disease, gastric emptying study to rule out gastric paresis and gallbladder ultrasound start her gallbladder workup.  Risk and benefits of EGD were discussed in detail in clinic today.  Patient voiced understanding, and agreement plan procedure wished to " proceed near future.    From a pilonidal cyst standpoint, the patient does not have evidence of pilonidal cyst disease.  She has a coccyx which is prominent but no evidence of gluteal cleft pits or cyst.    Will obtain preoperative lab test to include CBC, CMP for review by the Anesthesiologist the day of the procedure prior to induction of the anesthetic agent of choice.    Risk and benefits of EGD were discussed in clinic in depth.  Risk of EGD were discussed to include bleeding as well as perforation.  Patient understands that if the above procedural risks were to occur, he could need further intervention to include but not exclude a blood transfusion, repeat procedure, admission to the hospital, or even surgery which would likely require transfer to a higher level of care.  Total clinic time spent today 45 minutes with greater than half of the time spent in face to face counseling.        Patient instructed that best way to communicate with my office staff is for patient to get on the [facility] epic patient portal to expedite communication and communication issues that may occur.  Patient was given instructions on how to get on the portal.  I encouraged patient to obtain portal access as well.  Ultimately it is up to the patient to obtain access.  Patient voiced understanding.

## 2019-09-06 NOTE — H&P (VIEW-ONLY)
[facility] Surgery H&P Note    Subjective:       Patient ID: Heide Dockery is a 24 y.o. female.    Chief Complaint: Consult (Kslpwbaf-Ofvxx-Vtuubccuj cyst/EGD)    HPI:  Heide Dockery is a 24 y.o. female with history of hysterectomy tubal ligation presents today as a new patient referral from Dr. Alex for evaluation of weight loss and pilonidal cyst.  Patient states that she has had this pilonidal cyst for some time, since May.  It hurts.  No history of infection or inflammation.  Patient also with separate issue of weight loss.  She states that she has lost 15 lb since December.  It is related to the patient not wanting to eat.  Nausea but no vomiting.  She feels like she has full.  She eats a couple of bites in the gets completely full.  She does not will more.  She does not eat very often given she does not have an appetite.  No blood in the stool.  No other signs or symptoms of issues ongoing.  Patient now presents today as a new patient referral for evaluation above.    No past medical history on file.  Past Surgical History:   Procedure Laterality Date    abdominal lap      HYSTERECTOMY      HYSTERECTOMY TOTAL VAGINAL N/A 5/16/2018    [facility] at [facility]    TUBAL LIGATION       Family History   Problem Relation Age of Onset    No Known Problems Mother     No Known Problems Sister     No Known Problems Brother     Breast cancer Maternal Grandmother      Social History     Socioeconomic History    Marital status:      Spouse name: Not on file    Number of children: Not on file    Years of education: Not on file    Highest education level: Not on file   Occupational History    Not on file   Social Needs    Financial resource strain: Not on file    Food insecurity:     Worry: Not on file     Inability: Not on file    Transportation needs:     Medical: Not on file     Non-medical: Not on file   Tobacco Use    Smoking status: Current Every Day Smoker      Packs/day: 0.25     Years: 10.00     Pack years: 2.50    Smokeless tobacco: Current User   Substance and Sexual Activity    Alcohol use: Not on file    Drug use: Not on file    Sexual activity: Not on file   Lifestyle    Physical activity:     Days per week: Not on file     Minutes per session: Not on file    Stress: Not on file   Relationships    Social connections:     Talks on phone: Not on file     Gets together: Not on file     Attends Episcopalian service: Not on file     Active member of club or organization: Not on file     Attends meetings of clubs or organizations: Not on file     Relationship status: Not on file   Other Topics Concern    Not on file   Social History Narrative    Not on file       No current outpatient medications on file.     No current facility-administered medications for this visit.      Review of patient's allergies indicates:  No Known Allergies    Review of Systems   Constitutional: Negative for activity change, appetite change, chills and fever.   HENT: Negative for congestion, dental problem and ear discharge.    Eyes: Negative for discharge and itching.   Respiratory: Negative for apnea, choking and chest tightness.    Cardiovascular: Negative for chest pain and leg swelling.   Gastrointestinal: Negative for abdominal distention, abdominal pain, anal bleeding, constipation, diarrhea and nausea.   Endocrine: Negative for cold intolerance and heat intolerance.   Genitourinary: Negative for difficulty urinating and dyspareunia.   Musculoskeletal: Negative for arthralgias and back pain.   Skin: Negative for color change and pallor.   Neurological: Negative for dizziness and facial asymmetry.   Hematological: Negative for adenopathy. Does not bruise/bleed easily.   Psychiatric/Behavioral: Negative for agitation and behavioral problems.       Objective:      Vitals:    09/06/19 1035   BP: 120/61   Pulse: 74   Resp: 18   Temp: 96.8 °F (36 °C)   TempSrc: Oral   SpO2: 100%   Weight:  "56.3 kg (124 lb 1.9 oz)   Height: 5' 6" (1.676 m)     Physical Exam   Constitutional: She is oriented to person, place, and time. She appears well-developed and well-nourished. No distress.   HENT:   Head: Normocephalic and atraumatic.   Eyes: Pupils are equal, round, and reactive to light. EOM are normal.   Neck: Normal range of motion. Neck supple. No thyromegaly present.   Cardiovascular: Normal rate and regular rhythm.   Pulmonary/Chest: Effort normal and breath sounds normal.   Abdominal: Soft. Bowel sounds are normal. She exhibits no distension. There is no tenderness.   Musculoskeletal: Normal range of motion. She exhibits no edema or deformity.        Back:    Neurological: She is alert and oriented to person, place, and time. No cranial nerve deficit.   Skin: Skin is warm. Capillary refill takes less than 2 seconds. No erythema.   Psychiatric: She has a normal mood and affect. Her behavior is normal.        Assessment:       1. Weight loss    2. Pre-op testing    3. Pilonidal cyst        Plan:   Weight loss  -     US Abdomen Limited; Future; Expected date: 09/06/2019  -     NM Gastric Emptying; Future; Expected date: 09/06/2019    Pre-op testing  -     CBC auto differential; Future; Expected date: 12/06/2019  -     Comprehensive metabolic panel; Future; Expected date: 12/06/2019    Pilonidal cyst        Medical Decision Making/Counseling:  Patient with weight loss, intolerance of food, nausea, epigastric pain. Some it symptomatology differential diagnosis in the patient's age group suggest peptic ulcer disease, gastric paresis, biliary colic, etc.  Ultimately I believe the next best steps in this patient's workup will be for EGD for ruling out peptic ulcer disease, gastric emptying study to rule out gastric paresis and gallbladder ultrasound start her gallbladder workup.  Risk and benefits of EGD were discussed in detail in clinic today.  Patient voiced understanding, and agreement plan procedure wished to " proceed near future.    From a pilonidal cyst standpoint, the patient does not have evidence of pilonidal cyst disease.  She has a coccyx which is prominent but no evidence of gluteal cleft pits or cyst.    Will obtain preoperative lab test to include CBC, CMP for review by the Anesthesiologist the day of the procedure prior to induction of the anesthetic agent of choice.    Risk and benefits of EGD were discussed in clinic in depth.  Risk of EGD were discussed to include bleeding as well as perforation.  Patient understands that if the above procedural risks were to occur, he could need further intervention to include but not exclude a blood transfusion, repeat procedure, admission to the hospital, or even surgery which would likely require transfer to a higher level of care.  Total clinic time spent today 45 minutes with greater than half of the time spent in face to face counseling.        Patient instructed that best way to communicate with my office staff is for patient to get on the [facility] epic patient portal to expedite communication and communication issues that may occur.  Patient was given instructions on how to get on the portal.  I encouraged patient to obtain portal access as well.  Ultimately it is up to the patient to obtain access.  Patient voiced understanding.

## 2019-09-06 NOTE — PATIENT INSTRUCTIONS
Upper GI Endoscopy     During endoscopy, a long, flexible tube is used to view the inside of your upper GI tract.      Upper GI endoscopy allows your healthcare provider to look directly into the beginning of your gastrointestinal (GI) tract. The esophagus, stomach, and duodenum (the first part of the small intestine) make up the upper GI tract.   Before the exam  Follow these and any other instructions you are given before your endoscopy. If you dont follow the healthcare providers instructions carefully, the test may need to be canceled or done over:  · Don't eat or drink anything after midnight the night before your exam. If your exam is in the afternoon, drink only clear liquids in the morning. Don't eat or drink anything for 8 hours before the exam. In some cases, you may be able to take medicines with sips of water until 2 hours before the procedure. Speak with your healthcare provider about this.   · Bring your X-rays and any other test results you have.  · Because you will be sedated, arrange for an adult to drive you home after the exam.  · Tell your healthcare provider before the exam if you are taking any medicines or have any medical problems.  The procedure  Here is what to expect:  · You will lie on the endoscopy table. Usually patients lie on the left side.  · You will be monitored and given oxygen.  · Your throat may be numbed with a spray or gargle. You are given medicine through an intravenous (IV) line that will help you relax and remain comfortable. You may be awake or asleep during the procedure.  · The healthcare provider will put the endoscope in your mouth and down your esophagus. It is thinner than most pieces of food that you swallow. It will not affect your breathing. The medicine helps keep you from gagging.  · Air is put into your GI tract to expand it. It can make you burp.  · During the procedure, the healthcare provider can take biopsies (tissue samples), remove abnormalities,  such as polyps, or treat abnormalities through a variety of devices placed through the endoscope. You will not feel this.   · The endoscope carries images of your upper GI tract to a video screen. If you are awake, you may be able to look at the images.  · After the procedure is done, you will rest for a time. An adult must drive you home.  When to call your healthcare provider  Contact your healthcare provider if you have:  · Black or tarry stools, or blood in your stool  · Fever  · Pain in your belly that does not go away  · Nausea and vomiting, or vomiting blood   Date Last Reviewed: 7/1/2016 © 2000-2017 [employer]. [address] [place] [address], [place] [address]. All rights reserved. This information is not intended as a substitute for professional medical care. Always follow your healthcare professional's instructions.        Gastric Emptying Scan     With a gastric emptying scan, a machine called a scanner is used to take pictures of your stomach.   A gastric emptying scan is an imaging test. It measures how quickly food travels from the stomach into the small bowel (intestine). During the test, youre given a meal to eat that contains a small amount of radioactive substance (tracer). Then scans of the stomach are done. The tracer shows up clearly on the scans and tracks the movement of the food through your stomach. This test is most often needed if you have symptoms that suggest a motility problem. Motility refers to the movement of the muscles in the digestive tract. The test takes about 5 hours.  Before the test  · Let your healthcare provider know of any medicines youre taking. This includes vitamins, herbs, and over-the-counter medicines. Certain medicines may need to be stopped for a time in the days before the test.  · Dont eat or drink anything starting from 6 hours before the test.  · Follow any other instructions given by your healthcare provider.  Let the technologist know  For your  safety, let the technologist know if you:  · Are taking any medicines  · Had recent X-rays or tests involving other substances, such as barium  · Have current symptoms of nausea or vomiting  · Had recent surgery  · Have other health problems, such as diabetes  · Have any allergies  · Are pregnant or may be pregnant  · Are breastfeeding   During the test  A gastric emptying scan takes place in a hospital or imaging center. It is done by a technologist trained in nuclear medicine or radiology.  · Youll be given a meal to eat. This can be a solid food, such as eggs, or a liquid, such as water. Both the food and drink contain a small amount of tracer. The tracer has no flavor. If youre allergic to the food to be given, another type of food is used instead.  · After you finish the meal, youll be asked to lie on your back on an exam table.  · Pictures of your stomach are then taken with a machine called a scanner. You must lie still during this process. The scanner uses technology that can detect the amount of tracer in the stomach. As food is emptied from the stomach, the amount of tracer decreases. This allows the technologist to measure the rate at which food is leaving the stomach.  · More pictures of your stomach are taken at different times. This usually occurs after 1, 2, and 4 hours of eating the meal. You can leave the room between the times the pictures are taken. But do not eat or drink anything or perform any strenuous activities during this period.  · Once the last set of pictures has been taken, the test is complete.  After the test  You can go home shortly after the test. A nuclear medicine doctor or radiologist will communicate the test results to your healthcare provider. Your provider will then review the test results with you. This will likely occur within a few days of the test.   Risks and possible complications of this test  There is a small amount of radiation exposure from the tracer. This  amount is not considered to be dangerous, but it can carry certain risks if you are pregnant or breastfeeding. Be sure to talk to your healthcare provider about these risks before the test.   Date Last Reviewed: 2/1/2017 © 2000-2017 [employer]. [address] [place] [id number]. All rights reserved. This information is not intended as a substitute for professional medical care. Always follow your healthcare professional's instructions.        Gammagrafía del vaciamiento gástrico     En la gammagrafía del vaciamiento gástrico se usa un aparato llamado escáner para noman imágenes de haddad estómago.   La gammagrafía del vaciamiento gástrico es bryanna prueba con imágenes para medir la velocidad a la que los alimentos se desplazan desde el estómago hacia el intestino garcia. Ema esta prueba le darán bryanna comida que contiene bryanna pequeña cantidad de material radiactivo llamado trazador. A continuación se realiza la exploración del estómago. El trazador resulta claramente visible en las imágenes y permite observar el movimiento de la comida por el estómago. Esta prueba suele necesitarse si usted tiene síntomas que sugieren un problema de motilidad gástrica, es decir un problema en los movimientos de los músculos del tracto digestivo. La prueba dura unas 5 horas.  Antes de la prueba  · Informe a haddad médico de todos los medicamentos que esté tomando, sin olvidar las vitaminas, hierbas medicinales y medicamentos sin receta. Es posible que deba dejar de noman algunos de estos medicamentos ema un tiempo antes de la prueba.  · No coma ni judy nada a partir de 6 horas antes de la prueba.  · Siga todas las demás instrucciones que le dé haddad médico.  Informe al técnico  Para haddad seguridad, informe al técnico si usted:  · Está tomando algún medicamento.  · Le flores hecho recientemente radiografías con otras sustancias, wilbert bario.  · Tiene actualmente síntomas de náuseas o vómito.  · Ha tenido bryanna operación  reciente.  · Tiene otros problemas de mohamud, wilbert diabetes.  · Tiene alguna alergia.  · Está embarazada o piensa que pueda estarlo.  · Está amamantando.   Ema la prueba  La gammagrafía se lleva a cabo en un hospital, por un técnico especializado en medicina nuclear o radiología.  · Le darán bryanna comida que puede consistir en alimentos sólidos, wilbert huevos, o en un líquido wilbert agua. Tanto la comida sólida wilbert el líquido contienen bryanna pequeña cantidad de trazador. El trazador no tiene sabor. Si usted es alérgico a la comida que le ofrecen, usarán otro tipo de alimentos.  · Bryanna vez que haya terminado de comer, le pedirán que se acueste boca arriba en bryanna bajwa de exploración.  · Le tomarán imágenes del estómago con un aparato llamado escáner. Usted deberá estar inmóvil ema sid proceso. El escáner utiliza tecnología que puede detectar la cantidad de trazador presente en el estómago. A medida que el estómago se va vaciando de comida, la cantidad de trazador disminuye. [employer] permite al técnico medir la velocidad a la que la comida sale del estómago.  · Se hubert más imágenes del estómago en diferentes momentos. Chapman suele hacerse al cabo de 1, 2 y 4 horas después de la comida. Usted podrá salir de la habitación ema los intervalos entre las ashley de imágenes. Anabelle no debe comer, beber ni hacer actividades que requieran esfuerzo ema sid período.  · Bryanna vez que hayan tomado la última serie de imágenes, la prueba se da por concluida.  Después de la prueba  Usted podrá regresar a casa poco después de la prueba. Un especialista en medicina nuclear o un radiólogo examinarán los resultados con haddad médico. A continuación (probablemente al cabo de unos días) haddad médico hablará con usted para explicárselos.   Riesgos y complicaciones posibles de esta prueba  El trazador produce bryanna pequeña cantidad de radiación que no se considera peligrosa anabelle puede implicar ciertos riesgos si usted está embarazada o  josefina. Asegúrese de hablar con haddad médico acerca de estos riesgos antes de hacer esta prueba.   Date Last Reviewed:   © 5574-7051 [employer]. [address] [place] [address], [place] [address]. Todos los derechos reservados. Esta información no pretende sustituir la atención médica profesional. Sólo haddad médico puede diagnosticar y tratar un problema de mohamud.

## 2019-09-13 ENCOUNTER — HOSPITAL ENCOUNTER (OUTPATIENT)
Dept: RADIOLOGY | Facility: HOSPITAL | Age: 24
Discharge: HOME | End: 2019-09-13
Attending: SURGERY
Payer: MEDICAID

## 2019-09-13 DIAGNOSIS — R63.4 WEIGHT LOSS: ICD-10-CM

## 2019-09-13 PROCEDURE — 78264 NM GASTRIC EMPTYING: ICD-10-PCS | Mod: 26,,, | Performed by: RADIOLOGY

## 2019-09-13 PROCEDURE — 78264 GASTRIC EMPTYING IMG STUDY: CPT | Mod: 26,,, | Performed by: RADIOLOGY

## 2019-09-13 PROCEDURE — 78264 GASTRIC EMPTYING IMG STUDY: CPT | Mod: TC

## 2019-09-24 ENCOUNTER — ANESTHESIA EVENT (OUTPATIENT)
Dept: SURGERY | Facility: HOSPITAL | Age: 24
End: 2019-09-24
Payer: MEDICAID

## 2019-09-24 ENCOUNTER — ANESTHESIA (OUTPATIENT)
Dept: SURGERY | Facility: HOSPITAL | Age: 24
End: 2019-09-24
Payer: MEDICAID

## 2019-09-24 ENCOUNTER — HOSPITAL ENCOUNTER (OUTPATIENT)
Facility: HOSPITAL | Age: 24
Discharge: HOME | End: 2019-09-24
Attending: SURGERY | Admitting: SURGERY
Payer: MEDICAID

## 2019-09-24 VITALS
RESPIRATION RATE: 15 BRPM | HEIGHT: 66 IN | TEMPERATURE: 98 F | DIASTOLIC BLOOD PRESSURE: 73 MMHG | BODY MASS INDEX: 19.77 KG/M2 | OXYGEN SATURATION: 99 % | HEART RATE: 70 BPM | SYSTOLIC BLOOD PRESSURE: 109 MMHG | WEIGHT: 123 LBS

## 2019-09-24 DIAGNOSIS — R63.4 WEIGHT LOSS: ICD-10-CM

## 2019-09-24 PROCEDURE — D9220A PRA ANESTHESIA: ICD-10-PCS | Mod: ANES,,, | Performed by: ANESTHESIOLOGY

## 2019-09-24 PROCEDURE — 88305 TISSUE EXAM BY PATHOLOGIST: CPT | Performed by: PATHOLOGY

## 2019-09-24 PROCEDURE — 88305 TISSUE SPECIMEN TO PATHOLOGY - SURGERY: ICD-10-PCS | Mod: 26,,, | Performed by: PATHOLOGY

## 2019-09-24 PROCEDURE — 27201012 HC FORCEPS, HOT/COLD, DISP: Performed by: SURGERY

## 2019-09-24 PROCEDURE — D9220A PRA ANESTHESIA: Mod: CRNA,,, | Performed by: NURSE ANESTHETIST, CERTIFIED REGISTERED

## 2019-09-24 PROCEDURE — D9220A PRA ANESTHESIA: ICD-10-PCS | Mod: CRNA,,, | Performed by: NURSE ANESTHETIST, CERTIFIED REGISTERED

## 2019-09-24 PROCEDURE — 00731 ANES UPR GI NDSC PX NOS: CPT | Performed by: SURGERY

## 2019-09-24 PROCEDURE — 63600175 PHARM REV CODE 636 W HCPCS: Performed by: NURSE ANESTHETIST, CERTIFIED REGISTERED

## 2019-09-24 PROCEDURE — 37000008 HC ANESTHESIA 1ST 15 MINUTES: Performed by: SURGERY

## 2019-09-24 PROCEDURE — 88305 TISSUE EXAM BY PATHOLOGIST: CPT | Mod: 26,,, | Performed by: PATHOLOGY

## 2019-09-24 PROCEDURE — 43239 EGD BIOPSY SINGLE/MULTIPLE: CPT | Performed by: SURGERY

## 2019-09-24 PROCEDURE — 43239 PR EGD, FLEX, W/BIOPSY, SGL/MULTI: ICD-10-PCS | Mod: ,,, | Performed by: SURGERY

## 2019-09-24 PROCEDURE — S0028 INJECTION, FAMOTIDINE, 20 MG: HCPCS | Performed by: ANESTHESIOLOGY

## 2019-09-24 PROCEDURE — 63600175 PHARM REV CODE 636 W HCPCS: Performed by: SURGERY

## 2019-09-24 PROCEDURE — 25000003 PHARM REV CODE 250: Performed by: ANESTHESIOLOGY

## 2019-09-24 PROCEDURE — D9220A PRA ANESTHESIA: Mod: ANES,,, | Performed by: ANESTHESIOLOGY

## 2019-09-24 PROCEDURE — 43239 EGD BIOPSY SINGLE/MULTIPLE: CPT | Mod: ,,, | Performed by: SURGERY

## 2019-09-24 RX ORDER — PANTOPRAZOLE SODIUM 40 MG/1
40 TABLET, DELAYED RELEASE ORAL DAILY
Qty: 30 TABLET | Refills: 6 | Status: SHIPPED | OUTPATIENT
Start: 2019-09-24 | End: 2019-12-23

## 2019-09-24 RX ORDER — SODIUM CHLORIDE 9 MG/ML
INJECTION, SOLUTION INTRAVENOUS CONTINUOUS
Status: CANCELLED | OUTPATIENT
Start: 2019-09-24

## 2019-09-24 RX ORDER — SUCRALFATE 1 G/1
1 TABLET ORAL 4 TIMES DAILY
Qty: 120 TABLET | Refills: 0 | Status: SHIPPED | OUTPATIENT
Start: 2019-09-24 | End: 2019-12-23

## 2019-09-24 RX ORDER — SODIUM CHLORIDE, SODIUM LACTATE, POTASSIUM CHLORIDE, CALCIUM CHLORIDE 600; 310; 30; 20 MG/100ML; MG/100ML; MG/100ML; MG/100ML
INJECTION, SOLUTION INTRAVENOUS CONTINUOUS
Status: DISCONTINUED | OUTPATIENT
Start: 2019-09-24 | End: 2019-09-24 | Stop reason: HOSPADM

## 2019-09-24 RX ORDER — MIDAZOLAM HYDROCHLORIDE 1 MG/ML
INJECTION, SOLUTION INTRAMUSCULAR; INTRAVENOUS
Status: DISCONTINUED | OUTPATIENT
Start: 2019-09-24 | End: 2019-09-24

## 2019-09-24 RX ORDER — SODIUM CHLORIDE, SODIUM LACTATE, POTASSIUM CHLORIDE, CALCIUM CHLORIDE 600; 310; 30; 20 MG/100ML; MG/100ML; MG/100ML; MG/100ML
125 INJECTION, SOLUTION INTRAVENOUS CONTINUOUS
Status: DISCONTINUED | OUTPATIENT
Start: 2019-09-24 | End: 2019-09-24 | Stop reason: HOSPADM

## 2019-09-24 RX ORDER — PROPOFOL 10 MG/ML
VIAL (ML) INTRAVENOUS
Status: DISCONTINUED | OUTPATIENT
Start: 2019-09-24 | End: 2019-09-24

## 2019-09-24 RX ORDER — ONDANSETRON 2 MG/ML
4 INJECTION INTRAMUSCULAR; INTRAVENOUS DAILY PRN
Status: DISCONTINUED | OUTPATIENT
Start: 2019-09-24 | End: 2019-09-24 | Stop reason: HOSPADM

## 2019-09-24 RX ORDER — FAMOTIDINE 10 MG/ML
20 INJECTION INTRAVENOUS ONCE
Status: COMPLETED | OUTPATIENT
Start: 2019-09-24 | End: 2019-09-24

## 2019-09-24 RX ORDER — LIDOCAINE HYDROCHLORIDE 10 MG/ML
1 INJECTION, SOLUTION EPIDURAL; INFILTRATION; INTRACAUDAL; PERINEURAL ONCE
Status: DISCONTINUED | OUTPATIENT
Start: 2019-09-24 | End: 2019-09-24 | Stop reason: HOSPADM

## 2019-09-24 RX ORDER — FAMOTIDINE 10 MG/ML
INJECTION INTRAVENOUS
Status: DISCONTINUED
Start: 2019-09-24 | End: 2019-09-24 | Stop reason: HOSPADM

## 2019-09-24 RX ORDER — DIPHENHYDRAMINE HYDROCHLORIDE 50 MG/ML
12.5 INJECTION INTRAMUSCULAR; INTRAVENOUS
Status: DISCONTINUED | OUTPATIENT
Start: 2019-09-24 | End: 2019-09-24 | Stop reason: HOSPADM

## 2019-09-24 RX ORDER — SODIUM CHLORIDE 9 MG/ML
INJECTION, SOLUTION INTRAVENOUS CONTINUOUS
Status: DISCONTINUED | OUTPATIENT
Start: 2019-09-24 | End: 2019-09-24 | Stop reason: HOSPADM

## 2019-09-24 RX ADMIN — PROPOFOL 50 MG: 10 INJECTION, EMULSION INTRAVENOUS at 09:09

## 2019-09-24 RX ADMIN — PROPOFOL 30 MG: 10 INJECTION, EMULSION INTRAVENOUS at 09:09

## 2019-09-24 RX ADMIN — PROPOFOL 20 MG: 10 INJECTION, EMULSION INTRAVENOUS at 09:09

## 2019-09-24 RX ADMIN — SODIUM CHLORIDE: 0.9 INJECTION, SOLUTION INTRAVENOUS at 08:09

## 2019-09-24 RX ADMIN — MIDAZOLAM HYDROCHLORIDE 2 MG: 1 INJECTION, SOLUTION INTRAMUSCULAR; INTRAVENOUS at 09:09

## 2019-09-24 RX ADMIN — PROPOFOL 100 MG: 10 INJECTION, EMULSION INTRAVENOUS at 09:09

## 2019-09-24 RX ADMIN — FAMOTIDINE 20 MG: 10 INJECTION INTRAVENOUS at 08:09

## 2019-09-24 NOTE — TRANSFER OF CARE
"Anesthesia Transfer of Care Note    Patient: Heide Dockery    Procedure(s) Performed: Procedure(s) (LRB):  ESOPHAGOGASTRODUODENOSCOPY (EGD) (N/A)    Patient location: PACU    Anesthesia Type: general    Transport from OR: Transported from OR on room air with adequate spontaneous ventilation    Post pain: adequate analgesia    Post assessment: no apparent anesthetic complications and tolerated procedure well    Post vital signs: stable    Level of consciousness: awake, alert and oriented    Nausea/Vomiting: no nausea/vomiting    Complications: none    Transfer of care protocol was followed      Last vitals:   Visit Vitals  /66 (BP Location: Right arm, Patient Position: Lying)   Pulse 74   Temp 36.7 °C (98.1 °F) (Oral)   Resp 11   Ht 5' 6" (1.676 m)   Wt 55.8 kg (123 lb)   LMP 04/06/2018   SpO2 100%   Breastfeeding? No   BMI 19.85 kg/m²     "

## 2019-09-24 NOTE — ANESTHESIA PREPROCEDURE EVALUATION
09/24/2019  Heide Dockery is a 24 y.o., female.    Anesthesia Evaluation    I have reviewed the Patient Summary Reports.    I have reviewed the Nursing Notes.   I have reviewed the Medications.     Review of Systems  Social:  Smoker    Hematology/Oncology:  Hematology Normal   Oncology Normal     EENT/Dental:EENT/Dental Normal   Cardiovascular:  Cardiovascular Normal     Pulmonary:  Pulmonary Normal    Hepatic/GI:  Hepatic/GI Normal    Musculoskeletal:  Musculoskeletal Normal    Neurological:  Neurology Normal    Endocrine:  Endocrine Normal    Dermatological:  Skin Normal    Psych:  Psychiatric Normal           Physical Exam  General:  Well nourished    Airway/Jaw/Neck:  Airway Findings: Mouth Opening: Normal Tongue: Normal  General Airway Assessment: Adult  TM Distance: Normal, at least 6 cm       Chest/Lungs:  Chest/Lungs Findings: Clear to auscultation     Heart/Vascular:  Heart Findings: Rate: Normal  Rhythm: Regular Rhythm        Mental Status:  Mental Status Findings:  Cooperative, Alert and Oriented         Anesthesia Plan  Type of Anesthesia, risks & benefits discussed:  Anesthesia Type:  general  Patient's Preference:   Intra-op Monitoring Plan: standard ASA monitors  Intra-op Monitoring Plan Comments:   Post Op Pain Control Plan: IV/PO Opioids PRN  Post Op Pain Control Plan Comments:   Induction:   IV  Beta Blocker:  Patient is not currently on a Beta-Blocker (No further documentation required).       Informed Consent: Patient understands risks and agrees with Anesthesia plan.  Questions answered. Anesthesia consent signed with patient.  ASA Score: 1     Day of Surgery Review of History & Physical: I have interviewed and examined the patient. I have reviewed the patient's H&P dated:            Ready For Surgery From Anesthesia Perspective.

## 2019-09-24 NOTE — ANESTHESIA POSTPROCEDURE EVALUATION
Anesthesia Post Evaluation    Patient: Heide Dockery    Procedure(s) Performed: Procedure(s) (LRB):  ESOPHAGOGASTRODUODENOSCOPY (EGD) (N/A)    [facility] Anesthesia Post Op Evaluation      Vitals Value Taken Time   /85 9/24/2019  9:51 AM   Temp 36.6 °C (97.9 °F) 9/24/2019  9:20 AM   Pulse 70 9/24/2019 10:00 AM   Resp 15 9/24/2019 10:00 AM   SpO2 99 % 9/24/2019 10:00 AM   Vitals shown include unvalidated device data.      Event Time     Out of Recovery 09:51:11          Pain/Carola Score: Carola Score: 10 (9/24/2019 10:00 AM)

## 2019-09-24 NOTE — DISCHARGE SUMMARY
Discharge Note        SUMMARY     Admit Date: 9/24/2019    Attending Physician: Dmitry Segura MD     Discharge Physician: Dmitry Segura MD    Discharge Date: 9/24/2019 9:25 AM      Hospital Course: Patient tolerated procedure well.     Disposition: Home or Self Care    Patient Instructions:   Current Discharge Medication List      START taking these medications    Details   pantoprazole (PROTONIX) 40 MG tablet Take 1 tablet (40 mg total) by mouth once daily.  Qty: 30 tablet, Refills: 6      sucralfate (CARAFATE) 1 gram tablet Take 1 tablet (1 g total) by mouth 4 (four) times daily.  Qty: 120 tablet, Refills: 0             Discharge Procedure Orders (must include Diet, Follow-up, Activity):   Discharge Procedure Orders (must include Diet, Follow-up, Activity)   Diet general     Call MD for:  temperature >100.4     Call MD for:  persistent nausea and vomiting     Call MD for:  severe uncontrolled pain     Call MD for:  difficulty breathing, headache or visual disturbances     Call MD for:  redness, tenderness, or signs of infection (pain, swelling, redness, odor or green/yellow discharge around incision site)     Call MD for:  persistent dizziness or light-headedness        Follow Up:  Follow up as scheduled.  Resume routine diet.  Activity as tolerated.    No driving day of procedure.

## 2019-09-24 NOTE — PLAN OF CARE
Patient awake and alert. VSS. No complaints of pain or discomfort. Tolerating liquids without any N/V.

## 2019-09-24 NOTE — PROGRESS NOTES
Surgery Misc Note    Oozing proximal gastric ulcers and errosions.  Start protonix and carafate.  F/u surgery clinic in 2 weeks.

## 2019-09-24 NOTE — DISCHARGE INSTRUCTIONS
Gastritis (Adult)    Gastritis is inflammation and irritation of the stomach lining. It can be present for a short time (acute) or be long lasting (chronic). Gastritis is often caused by infection with bacteria called H pylori. More than a third of people in the [place] have this bacteria in their bodies. In many cases, H pylori causes no problems or symptoms. In some people, though, the infection irritates the stomach lining and causes gastritis. Other causes of stomach irritation include drinking alcohol or taking pain-relieving medicines called NSAIDs (such as aspirin or ibuprofen).   Symptoms of gastritis can include:  · Abdominal pain or bloating  · Loss of appetite  · Nausea or vomiting  · Vomiting blood or having black stools  · Feeling more tired than usual  An inflamed and irritated stomach lining is more likely to develop a sore called an ulcer. To help prevent this, gastritis should be treated.  Home care  If needed, medicines may be prescribed. If you have H pylori infection, treating it will likely relieve your symptoms. Other changes can help reduce stomach irritation and help it heal.  · If you have been prescribed medicines for H pylori infection, take them as directed. Take all of the medicine until it is finished or your healthcare provider tells you to stop, even if you feel better.  · Your healthcare provider may recommend avoiding NSAIDs. If you take daily aspirin for your heart or other medical reasons, do not stop without talking to your healthcare provider first.  · Avoid drinking alcohol.  · Stop smoking. Smoking can irritate the stomach and delay healing. As much as possible, stay away from second hand smoke.  Follow-up care  Follow up with your healthcare provider, or as advised by our staff. Testing may be needed to check for inflammation or an ulcer.  When to seek medical advice  Call your healthcare provider for any of the following:  · Stomach pain that gets worse or moves to the lower  right abdomen (appendix area)  · Chest pain that appears or gets worse, or spreads to the back, neck, shoulder, or arm  · Frequent vomiting (cant keep down liquids)  · Blood in the stool or vomit (red or black in color)  · Feeling weak or dizzy  · Fever of 100.4ºF (38ºC) or higher, or as directed by your healthcare provider  Date Last Reviewed: 6/22/2015 © 2000-2017 [employer]. [address] [place] [address]. All rights reserved. This information is not intended as a substitute for professional medical care. Always follow your healthcare professional's instructions.        Upper GI Endoscopy     During endoscopy, a long, flexible tube is used to view the inside of your upper GI tract.      Upper GI endoscopy allows your healthcare provider to look directly into the beginning of your gastrointestinal (GI) tract. The esophagus, stomach, and duodenum (the first part of the small intestine) make up the upper GI tract.   Before the exam  Follow these and any other instructions you are given before your endoscopy. If you dont follow the healthcare providers instructions carefully, the test may need to be canceled or done over:  · Don't eat or drink anything after midnight the night before your exam. If your exam is in the afternoon, drink only clear liquids in the morning. Don't eat or drink anything for 8 hours before the exam. In some cases, you may be able to take medicines with sips of water until 2 hours before the procedure. Speak with your healthcare provider about this.   · Bring your X-rays and any other test results you have.  · Because you will be sedated, arrange for an adult to drive you home after the exam.  · Tell your healthcare provider before the exam if you are taking any medicines or have any medical problems.  The procedure  Here is what to expect:  · You will lie on the endoscopy table. Usually patients lie on the left side.  · You will be monitored and given  oxygen.  · Your throat may be numbed with a spray or gargle. You are given medicine through an intravenous (IV) line that will help you relax and remain comfortable. You may be awake or asleep during the procedure.  · The healthcare provider will put the endoscope in your mouth and down your esophagus. It is thinner than most pieces of food that you swallow. It will not affect your breathing. The medicine helps keep you from gagging.  · Air is put into your GI tract to expand it. It can make you burp.  · During the procedure, the healthcare provider can take biopsies (tissue samples), remove abnormalities, such as polyps, or treat abnormalities through a variety of devices placed through the endoscope. You will not feel this.   · The endoscope carries images of your upper GI tract to a video screen. If you are awake, you may be able to look at the images.  · After the procedure is done, you will rest for a time. An adult must drive you home.  When to call your healthcare provider  Contact your healthcare provider if you have:  · Black or tarry stools, or blood in your stool  · Fever  · Pain in your belly that does not go away  · Nausea and vomiting, or vomiting blood   Date Last Reviewed: 7/1/2016  © 0148-5412 [employer]. [address] [place] [address], [place] [address]. All rights reserved. This information is not intended as a substitute for professional medical care. Always follow your healthcare professional's instructions.        Discharge Instructions: After Your Surgery  Youve just had surgery. During surgery, you were given medicine called anesthesia to keep you relaxed and free of pain. After surgery, you may have some pain or nausea. This is common. Here are some tips for feeling better and getting well after surgery.     Stay on schedule with your medicine.   Going home  Your healthcare provider will show you how to take care of yourself when you go home. He or she will also answer your  questions. Have an adult family member or friend drive you home. For the first 24 hours after your surgery:  · Do not drive or use heavy equipment.  · Do not make important decisions or sign legal papers.  · Do not drink alcohol.  · Have someone stay with you, if needed. He or she can watch for problems and help keep you safe.  Be sure to go to all follow-up visits with your healthcare provider. And rest after your surgery for as long as your healthcare provider tells you to.  Coping with pain  If you have pain after surgery, pain medicine will help you feel better. Take it as told, before pain becomes severe. Also, ask your healthcare provider or pharmacist about other ways to control pain. This might be with heat, ice, or relaxation. And follow any other instructions your surgeon or nurse gives you.  Tips for taking pain medicine  To get the best relief possible, remember these points:  · Pain medicines can upset your stomach. Taking them with a little food may help.  · Most pain relievers taken by mouth need at least 20 to 30 minutes to start to work.  · Taking medicine on a schedule can help you remember to take it. Try to time your medicine so that you can take it before starting an activity. This might be before you get dressed, go for a walk, or sit down for dinner.  · Constipation is a common side effect of pain medicines. Call your healthcare provider before taking any medicines such as laxatives or stool softeners to help ease constipation. Also ask if you should skip any foods. Drinking lots of fluids and eating foods such as fruits and vegetables that are high in fiber can also help. Remember, do not take laxatives unless your surgeon has prescribed them.  · Drinking alcohol and taking pain medicine can cause dizziness and slow your breathing. It can even be deadly. Do not drink alcohol while taking pain medicine.  · Pain medicine can make you react more slowly to things. Do not drive or run machinery  while taking pain medicine.  Your healthcare provider may tell you to take acetaminophen to help ease your pain. Ask him or her how much you are supposed to take each day. Acetaminophen or other pain relievers may interact with your prescription medicines or other over-the-counter (OTC) medicines. Some prescription medicines have acetaminophen and other ingredients. Using both prescription and OTC acetaminophen for pain can cause you to overdose. Read the labels on your OTC medicines with care. This will help you to clearly know the list of ingredients, how much to take, and any warnings. It may also help you not take too much acetaminophen. If you have questions or do not understand the information, ask your pharmacist or healthcare provider to explain it to you before you take the OTC medicine.  Managing nausea  Some people have an upset stomach after surgery. This is often because of anesthesia, pain, or pain medicine, or the stress of surgery. These tips will help you handle nausea and eat healthy foods as you get better. If you were on a special food plan before surgery, ask your healthcare provider if you should follow it while you get better. These tips may help:  · Do not push yourself to eat. Your body will tell you when to eat and how much.  · Start off with clear liquids and soup. They are easier to digest.  · Next try semi-solid foods, such as mashed potatoes, applesauce, and gelatin, as you feel ready.  · Slowly move to solid foods. Dont eat fatty, rich, or spicy foods at first.  · Do not force yourself to have 3 large meals a day. Instead eat smaller amounts more often.  · Take pain medicines with a small amount of solid food, such as crackers or toast, to avoid nausea.     Call your surgeon if  · You still have pain an hour after taking medicine. The medicine may not be strong enough.  · You feel too sleepy, dizzy, or groggy. The medicine may be too strong.  · You have side effects like nausea,  vomiting, or skin changes, such as rash, itching, or hives.       If you have obstructive sleep apnea  You were given anesthesia medicine during surgery to keep you comfortable and free of pain. After surgery, you may have more apnea spells because of this medicine and other medicines you were given. The spells may last longer than usual.   At home:  · Keep using the continuous positive airway pressure (CPAP) device when you sleep. Unless your healthcare provider tells you not to, use it when you sleep, day or night. CPAP is a common device used to treat obstructive sleep apnea.  · Talk with your provider before taking any pain medicine, muscle relaxants, or sedatives. Your provider will tell you about the possible dangers of taking these medicines.  Date Last Reviewed: 12/1/2016  © 7214-6767 [employer]. [address] [place] [address], [place] [address]. All rights reserved. This information is not intended as a substitute for professional medical care. Always follow your healthcare professional's instructions.

## 2019-09-24 NOTE — ANESTHESIA POSTPROCEDURE EVALUATION
Anesthesia Post Evaluation    Patient: Heide Dockery    Procedure(s) Performed: Procedure(s) (LRB):  ESOPHAGOGASTRODUODENOSCOPY (EGD) (N/A)    Final Anesthesia Type: general  Patient location during evaluation: PACU  Patient participation: Yes- Able to Participate  Level of consciousness: awake and alert  Post-procedure vital signs: reviewed and stable  Pain management: adequate  Airway patency: patent  PONV status at discharge: No PONV  Anesthetic complications: no      Cardiovascular status: blood pressure returned to baseline  Respiratory status: unassisted  Hydration status: euvolemic  Follow-up not needed.          Vitals Value Taken Time   /85 9/24/2019  9:51 AM   Temp 36.6 °C (97.9 °F) 9/24/2019  9:20 AM   Pulse 70 9/24/2019 10:00 AM   Resp 15 9/24/2019 10:00 AM   SpO2 99 % 9/24/2019 10:00 AM   Vitals shown include unvalidated device data.      Event Time     Out of Recovery 09:51:11          Pain/Carola Score: Carola Score: 10 (9/24/2019 10:00 AM)

## 2019-09-24 NOTE — INTERVAL H&P NOTE
The patient has been examined and the H&P has been reviewed:    I concur with the findings and no changes have occurred since H&P was written.    Surgery risks, benefits and alternative options discussed and understood by patient/family.          Active Hospital Problems    Diagnosis  POA    Weight loss [R63.4]  Yes      Resolved Hospital Problems   No resolved problems to display.

## 2019-09-24 NOTE — [employer] PATIENT INSTRUCTIONS
Discharge Summary/Instructions after an Endoscopic Procedure  Patient Name: Heide Dockery  Patient MRN: [id number]  Patient YOB: 1995 Tuesday, September 24, 2019  Dmitry Segura MD  RESTRICTIONS:  During your procedure today, you received medications for sedation.  These   medications may affect your judgment, balance and coordination.  Therefore,   for 24 hours, you have the following restrictions:   - DO NOT drive a car, operate machinery, make legal/financial decisions,   sign important papers or drink alcohol.    ACTIVITY:  Today: no heavy lifting, straining or running due to procedural   sedation/anesthesia.  The following day: return to full activity including work.  DIET:  Eat and drink normally unless instructed otherwise.     TREATMENT FOR COMMON SIDE EFFECTS:  - Mild abdominal pain, nausea, belching, bloating or excessive gas:  rest,   eat lightly and use a heating pad.  - Sore Throat: treat with throat lozenges and/or gargle with warm salt   water.  - Because air was used during the procedure, expelling large amounts of air   from your rectum or belching is normal.  - If a bowel prep was taken, you may not have a bowel movement for 1-3 days.    This is normal.  SYMPTOMS TO WATCH FOR AND REPORT TO YOUR PHYSICIAN:  1. Abdominal pain or bloating, other than gas cramps.  2. Chest pain.  3. Back pain.  4. Signs of infection such as: chills or fever occurring within 24 hours   after the procedure.  5. Rectal bleeding, which would show as bright red, maroon, or black stools.   (A tablespoon of blood from the rectum is not serious, especially if   hemorrhoids are present.)  6. Vomiting.  7. Weakness or dizziness.  GO DIRECTLY TO THE NEAREST EMERGENCY ROOM IF YOU HAVE ANY OF THE FOLLOWING:      Difficulty breathing              Chills and/or fever over 101 F   Persistent vomiting and/or vomiting blood   Severe abdominal pain   Severe chest pain   Black, tarry stools   Bleeding- more than one  tablespoon   Any other symptom or condition that you feel may need urgent attention  Your doctor recommends these additional instructions:  If any biopsies were taken, your doctors clinic will contact you in 1 to 2   weeks with any results.  - Discharge patient to home (ambulatory).   - Resume previous diet.   - Use Protonix (pantoprazole) 40 mg PO daily.   - Use sucralfate tablets 1 gram PO QID.   - Await pathology results.   - Return to my office in 2 weeks.  For questions, problems or results please call your physician - Dmitry Segura MD at Work:  [phone].  [facility] EMERGENCY ROOM PHONE NUMBER: [phone]  IF A COMPLICATION OR EMERGENCY SITUATION ARISES AND YOU ARE UNABLE TO REACH   YOUR PHYSICIAN - GO DIRECTLY TO THE EMERGENCY ROOM.  MD Dmitry Tellez MD  9/24/2019 9:21:37 AM  This report has been verified and signed electronically.  [employer]

## 2019-12-23 ENCOUNTER — HOSPITAL ENCOUNTER (EMERGENCY)
Facility: HOSPITAL | Age: 24
Discharge: HOME | End: 2019-12-23
Payer: MEDICAID

## 2019-12-23 VITALS
BODY MASS INDEX: 20.25 KG/M2 | SYSTOLIC BLOOD PRESSURE: 125 MMHG | HEIGHT: 67 IN | DIASTOLIC BLOOD PRESSURE: 82 MMHG | TEMPERATURE: 99 F | HEART RATE: 103 BPM | WEIGHT: 129 LBS | OXYGEN SATURATION: 99 % | RESPIRATION RATE: 16 BRPM

## 2019-12-23 DIAGNOSIS — S02.2XXA CLOSED FRACTURE OF NASAL BONE, INITIAL ENCOUNTER: ICD-10-CM

## 2019-12-23 DIAGNOSIS — S09.90XA INJURY OF HEAD, INITIAL ENCOUNTER: ICD-10-CM
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
## 2019-12-23 PROCEDURE — 70450 CT HEAD WITHOUT CONTRAST: ICD-10-PCS | Mod: 26,,, | Performed by: RADIOLOGY

## 2019-12-23 PROCEDURE — 70486 CT MAXILLOFACIAL W/O DYE: CPT | Mod: 26,,, | Performed by: RADIOLOGY

## 2019-12-23 PROCEDURE — 99284 EMERGENCY DEPT VISIT MOD MDM: CPT | Mod: 25

## 2019-12-23 PROCEDURE — 70486 CT MAXILLOFACIAL W/O DYE: CPT | Mod: TC

## 2019-12-23 PROCEDURE — 70486 CT MAXILLOFACIAL WITHOUT CONTRAST: ICD-10-PCS | Mod: 26,,, | Performed by: RADIOLOGY

## 2019-12-23 PROCEDURE — 70450 CT HEAD/BRAIN W/O DYE: CPT | Mod: TC

## 2019-12-23 PROCEDURE — 70450 CT HEAD/BRAIN W/O DYE: CPT | Mod: 26,,, | Performed by: RADIOLOGY
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
## 2019-12-23 NOTE — DISCHARGE INSTRUCTIONS
Motrin and ice to face as needed    Return to ED immediatly with any of the following  Vomiting (some vomiting is common, but tell the doctor about any vomiting)  Clear or bloody drainage from the nose or ear  Constant drowsiness or difficulty in waking up  Confusion or memory loss  Blurred vision or any vision changes  Inability to walk or talk normally  Increased weakness or problems with coordination  Constant, unrelieved headache that becomes more severe
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
## 2020-07-08 PROBLEM — J03.90 TONSILLITIS WITH EXUDATE: Status: ACTIVE | Noted: 2020-07-08

## 2020-12-18 ENCOUNTER — HOSPITAL ENCOUNTER (OUTPATIENT)
Dept: PREADMISSION TESTING | Facility: HOSPITAL | Age: 25
Discharge: HOME | End: 2020-12-18
Attending: OBSTETRICS & GYNECOLOGY
Payer: MEDICAID

## 2020-12-18 DIAGNOSIS — Z01.818 PREOP EXAMINATION: ICD-10-CM

## 2020-12-18 PROBLEM — N94.89 ADNEXAL MASS: Status: ACTIVE | Noted: 2020-12-18

## 2020-12-18 NOTE — H&P (VIEW-ONLY)
Subjective:       Patient ID: Heide Dockery is a 25 y.o. female.    Chief Complaint:  surgery    History of Present Illness  24yo  here for laparotomy salpingo-oophorectomy.  Found to have large adnexal mass causing pelvic pain.       Per prior note:  H/o pelvic pain. Pain is on right.   Was seen for this issue 2 months ago. Had BV, took flagyl. GC/CT urine culture were negative.  States pain has worsened since then.   S/p TVH for pain 2018     Denies other GI issues, saw GI last year for unexplained weight loss, states she was down to about 100 lb, now has gained back.      US shows 8.5 x 9.5 cm complex right ovarian mass  CA-125 normal    History reviewed. No pertinent past medical history.    Past Surgical History:   Procedure Laterality Date    abdominal lap      ESOPHAGOGASTRODUODENOSCOPY N/A 2019    Procedure: ESOPHAGOGASTRODUODENOSCOPY (EGD);  Surgeon: Dmitry Segura MD;  Location: [facility];  Service: General;  Laterality: N/A;    HYSTERECTOMY      TUBAL LIGATION      WISDOM TOOTH EXTRACTION         Family History   Problem Relation Age of Onset    No Known Problems Mother     No Known Problems Sister     No Known Problems Brother     Breast cancer Maternal Grandmother        Social History     Socioeconomic History    Marital status:      Spouse name: Not on file    Number of children: Not on file    Years of education: Not on file    Highest education level: Not on file   Occupational History    Not on file   Social Needs    Financial resource strain: Not on file    Food insecurity     Worry: Not on file     Inability: Not on file    Transportation needs     Medical: Not on file     Non-medical: Not on file   Tobacco Use    Smoking status: Current Every Day Smoker     Packs/day: 0.25     Years: 10.00     Pack years: 2.50    Smokeless tobacco: Current User   Substance and Sexual Activity    Alcohol use: Never     Frequency: Never    Drug use: Never     Sexual activity: Yes     Birth control/protection: See Surgical Hx   Lifestyle    Physical activity     Days per week: Not on file     Minutes per session: Not on file    Stress: Not on file   Relationships    Social connections     Talks on phone: Not on file     Gets together: Not on file     Attends Baptism service: Not on file     Active member of club or organization: Not on file     Attends meetings of clubs or organizations: Not on file     Relationship status: Not on file   Other Topics Concern    Not on file   Social History Narrative    Not on file       Current Outpatient Medications   Medication Sig Dispense Refill    HYDROcodone-acetaminophen (NORCO) 5-325 mg per tablet Take 1 tablet by mouth every 4 (four) hours as needed for Pain. 20 tablet 0    ibuprofen (ADVIL,MOTRIN) 800 MG tablet Take 1 tablet (800 mg total) by mouth 3 (three) times daily as needed for Pain. 30 tablet 1    predniSONE (DELTASONE) 20 MG tablet       metroNIDAZOLE (METROGEL) 0.75 % vaginal gel Insert 1 applicator full vaginally q night x 3 nights, skip 3 nights and repeat for 3 weeks (Patient not taking: Reported on 12/10/2020) 70 g 3     No current facility-administered medications for this visit.        Review of patient's allergies indicates:  No Known Allergies      GYN & OB History  Patient's last menstrual period was 04/06/2018.   Date of Last Pap: No result found    OB History   No obstetric history on file.       Review of Systems  Review of Systems   All other systems reviewed and are negative.          Objective:    Physical Exam:   Constitutional: She appears well-developed.    HENT:   Head: Atraumatic.      Cardiovascular: Normal rate.     Pulmonary/Chest: Effort normal.        Abdominal: Soft.             Musculoskeletal: Moves all extremeties.       Neurological: She is alert.    Skin: Skin is warm.    Psychiatric: She has a normal mood and affect.          Assessment:        1. Adnexal mass    2. Pelvic pain            Plan:      - plan for open RSO with frozen section, if the pathology is concerning will consider contralateral SO and consulting General Surgery for further intra-operative evaluation.  Risks discussed, consents signed

## 2020-12-21 ENCOUNTER — ANESTHESIA EVENT (OUTPATIENT)
Dept: SURGERY | Facility: HOSPITAL | Age: 25
End: 2020-12-21
Payer: MEDICAID

## 2020-12-21 ENCOUNTER — ANESTHESIA (OUTPATIENT)
Dept: SURGERY | Facility: HOSPITAL | Age: 25
End: 2020-12-21
Payer: MEDICAID

## 2020-12-21 ENCOUNTER — HOSPITAL ENCOUNTER (INPATIENT)
Facility: HOSPITAL | Age: 25
LOS: 1 days | Discharge: HOME | End: 2020-12-22
Attending: OBSTETRICS & GYNECOLOGY | Admitting: OBSTETRICS & GYNECOLOGY
Payer: MEDICAID

## 2020-12-21 DIAGNOSIS — N94.89 ADNEXAL MASS: Primary | ICD-10-CM

## 2020-12-21 LAB — B-HCG UR QL: NEGATIVE

## 2020-12-21 PROCEDURE — 88331 PR  PATH CONSULT IN SURG,W FRZ SEC: ICD-10-PCS | Mod: 26,,, | Performed by: STUDENT IN AN ORGANIZED HEALTH CARE EDUCATION/TRAINING PROGRAM

## 2020-12-21 PROCEDURE — 25000003 PHARM REV CODE 250: Performed by: NURSE ANESTHETIST, CERTIFIED REGISTERED

## 2020-12-21 PROCEDURE — 88305 TISSUE EXAM BY PATHOLOGIST: CPT | Performed by: STUDENT IN AN ORGANIZED HEALTH CARE EDUCATION/TRAINING PROGRAM

## 2020-12-21 PROCEDURE — 37000008 HC ANESTHESIA 1ST 15 MINUTES: Performed by: OBSTETRICS & GYNECOLOGY

## 2020-12-21 PROCEDURE — 88331 PATH CONSLTJ SURG 1 BLK 1SPC: CPT | Performed by: STUDENT IN AN ORGANIZED HEALTH CARE EDUCATION/TRAINING PROGRAM

## 2020-12-21 PROCEDURE — 36000709 HC OR TIME LEV III EA ADD 15 MIN: Performed by: OBSTETRICS & GYNECOLOGY

## 2020-12-21 PROCEDURE — 88331 PATH CONSLTJ SURG 1 BLK 1SPC: CPT | Mod: 26,,, | Performed by: STUDENT IN AN ORGANIZED HEALTH CARE EDUCATION/TRAINING PROGRAM

## 2020-12-21 PROCEDURE — 71000033 HC RECOVERY, INTIAL HOUR: Performed by: OBSTETRICS & GYNECOLOGY

## 2020-12-21 PROCEDURE — 63600175 PHARM REV CODE 636 W HCPCS: Performed by: OBSTETRICS & GYNECOLOGY

## 2020-12-21 PROCEDURE — 63600175 PHARM REV CODE 636 W HCPCS: Performed by: ANESTHESIOLOGY

## 2020-12-21 PROCEDURE — 37000009 HC ANESTHESIA EA ADD 15 MINS: Performed by: OBSTETRICS & GYNECOLOGY

## 2020-12-21 PROCEDURE — 25000003 PHARM REV CODE 250: Performed by: ANESTHESIOLOGY

## 2020-12-21 PROCEDURE — D9220A PRA ANESTHESIA: Mod: ,,, | Performed by: ANESTHESIOLOGY

## 2020-12-21 PROCEDURE — 25000003 PHARM REV CODE 250: Performed by: OBSTETRICS & GYNECOLOGY

## 2020-12-21 PROCEDURE — 63600175 PHARM REV CODE 636 W HCPCS

## 2020-12-21 PROCEDURE — 88305 TISSUE EXAM BY PATHOLOGIST: CPT | Mod: 26,,, | Performed by: STUDENT IN AN ORGANIZED HEALTH CARE EDUCATION/TRAINING PROGRAM

## 2020-12-21 PROCEDURE — 88305 TISSUE EXAM BY PATHOLOGIST: ICD-10-PCS | Mod: 26,,, | Performed by: STUDENT IN AN ORGANIZED HEALTH CARE EDUCATION/TRAINING PROGRAM

## 2020-12-21 PROCEDURE — 63600175 PHARM REV CODE 636 W HCPCS: Performed by: NURSE ANESTHETIST, CERTIFIED REGISTERED

## 2020-12-21 PROCEDURE — 36000708 HC OR TIME LEV III 1ST 15 MIN: Performed by: OBSTETRICS & GYNECOLOGY

## 2020-12-21 PROCEDURE — 27201423 OPTIME MED/SURG SUP & DEVICES STERILE SUPPLY: Performed by: OBSTETRICS & GYNECOLOGY

## 2020-12-21 PROCEDURE — 11000001 HC ACUTE MED/SURG PRIVATE ROOM

## 2020-12-21 PROCEDURE — 81025 URINE PREGNANCY TEST: CPT

## 2020-12-21 PROCEDURE — D9220A PRA ANESTHESIA: ICD-10-PCS | Mod: ,,, | Performed by: ANESTHESIOLOGY

## 2020-12-21 RX ORDER — LIDOCAINE HYDROCHLORIDE 10 MG/ML
1 INJECTION, SOLUTION EPIDURAL; INFILTRATION; INTRACAUDAL; PERINEURAL ONCE
Status: DISCONTINUED | OUTPATIENT
Start: 2020-12-21 | End: 2020-12-21 | Stop reason: HOSPADM

## 2020-12-21 RX ORDER — DIPHENHYDRAMINE HCL 25 MG
25 CAPSULE ORAL EVERY 4 HOURS PRN
Status: DISCONTINUED | OUTPATIENT
Start: 2020-12-21 | End: 2020-12-22 | Stop reason: HOSPADM

## 2020-12-21 RX ORDER — SIMETHICONE 80 MG
80 TABLET,CHEWABLE ORAL EVERY 4 HOURS PRN
Status: DISCONTINUED | OUTPATIENT
Start: 2020-12-21 | End: 2020-12-22 | Stop reason: HOSPADM

## 2020-12-21 RX ORDER — MORPHINE SULFATE 4 MG/ML
INJECTION, SOLUTION INTRAMUSCULAR; INTRAVENOUS
Status: COMPLETED
Start: 2020-12-21 | End: 2020-12-21

## 2020-12-21 RX ORDER — MORPHINE SULFATE 4 MG/ML
4 INJECTION, SOLUTION INTRAMUSCULAR; INTRAVENOUS EVERY 4 HOURS PRN
Status: DISCONTINUED | OUTPATIENT
Start: 2020-12-21 | End: 2020-12-22

## 2020-12-21 RX ORDER — KETOROLAC TROMETHAMINE 30 MG/ML
30 INJECTION, SOLUTION INTRAMUSCULAR; INTRAVENOUS EVERY 6 HOURS
Status: COMPLETED | OUTPATIENT
Start: 2020-12-21 | End: 2020-12-22

## 2020-12-21 RX ORDER — SODIUM CHLORIDE 9 MG/ML
INJECTION, SOLUTION INTRAVENOUS CONTINUOUS
Status: DISCONTINUED | OUTPATIENT
Start: 2020-12-21 | End: 2020-12-22 | Stop reason: HOSPADM

## 2020-12-21 RX ORDER — MORPHINE SULFATE 4 MG/ML
2 INJECTION, SOLUTION INTRAMUSCULAR; INTRAVENOUS EVERY 5 MIN PRN
Status: DISCONTINUED | OUTPATIENT
Start: 2020-12-21 | End: 2020-12-21 | Stop reason: HOSPADM

## 2020-12-21 RX ORDER — ONDANSETRON 2 MG/ML
INJECTION INTRAMUSCULAR; INTRAVENOUS
Status: DISPENSED
Start: 2020-12-21 | End: 2020-12-21

## 2020-12-21 RX ORDER — ONDANSETRON 2 MG/ML
INJECTION INTRAMUSCULAR; INTRAVENOUS
Status: DISCONTINUED | OUTPATIENT
Start: 2020-12-21 | End: 2020-12-21

## 2020-12-21 RX ORDER — SODIUM CHLORIDE, SODIUM LACTATE, POTASSIUM CHLORIDE, CALCIUM CHLORIDE 600; 310; 30; 20 MG/100ML; MG/100ML; MG/100ML; MG/100ML
INJECTION, SOLUTION INTRAVENOUS CONTINUOUS
Status: DISCONTINUED | OUTPATIENT
Start: 2020-12-21 | End: 2020-12-22 | Stop reason: HOSPADM

## 2020-12-21 RX ORDER — PROMETHAZINE HYDROCHLORIDE 25 MG/ML
INJECTION, SOLUTION INTRAMUSCULAR; INTRAVENOUS
Status: COMPLETED
Start: 2020-12-21 | End: 2020-12-21

## 2020-12-21 RX ORDER — CEFAZOLIN SODIUM 2 G/50ML
2 SOLUTION INTRAVENOUS
Status: COMPLETED | OUTPATIENT
Start: 2020-12-21 | End: 2020-12-21

## 2020-12-21 RX ORDER — SODIUM CHLORIDE, SODIUM LACTATE, POTASSIUM CHLORIDE, CALCIUM CHLORIDE 600; 310; 30; 20 MG/100ML; MG/100ML; MG/100ML; MG/100ML
125 INJECTION, SOLUTION INTRAVENOUS CONTINUOUS
Status: DISCONTINUED | OUTPATIENT
Start: 2020-12-21 | End: 2020-12-22 | Stop reason: HOSPADM

## 2020-12-21 RX ORDER — PHENAZOPYRIDINE HYDROCHLORIDE 100 MG/1
200 TABLET, FILM COATED ORAL
Status: DISCONTINUED | OUTPATIENT
Start: 2020-12-21 | End: 2020-12-21 | Stop reason: HOSPADM

## 2020-12-21 RX ORDER — IBUPROFEN 400 MG/1
800 TABLET ORAL EVERY 8 HOURS
Status: DISCONTINUED | OUTPATIENT
Start: 2020-12-22 | End: 2020-12-22 | Stop reason: HOSPADM

## 2020-12-21 RX ORDER — LIDOCAINE HYDROCHLORIDE 10 MG/ML
INJECTION INFILTRATION; PERINEURAL
Status: DISCONTINUED | OUTPATIENT
Start: 2020-12-21 | End: 2020-12-21 | Stop reason: HOSPADM

## 2020-12-21 RX ORDER — ROCURONIUM BROMIDE 10 MG/ML
INJECTION, SOLUTION INTRAVENOUS
Status: DISCONTINUED | OUTPATIENT
Start: 2020-12-21 | End: 2020-12-21

## 2020-12-21 RX ORDER — OXYCODONE AND ACETAMINOPHEN 5; 325 MG/1; MG/1
1 TABLET ORAL
Status: DISCONTINUED | OUTPATIENT
Start: 2020-12-21 | End: 2020-12-21 | Stop reason: HOSPADM

## 2020-12-21 RX ORDER — MIDAZOLAM HYDROCHLORIDE 1 MG/ML
2 INJECTION INTRAMUSCULAR; INTRAVENOUS ONCE
Status: DISCONTINUED | OUTPATIENT
Start: 2020-12-21 | End: 2020-12-22 | Stop reason: HOSPADM

## 2020-12-21 RX ORDER — KETOROLAC TROMETHAMINE 30 MG/ML
INJECTION, SOLUTION INTRAMUSCULAR; INTRAVENOUS
Status: COMPLETED
Start: 2020-12-21 | End: 2020-12-21

## 2020-12-21 RX ORDER — PROPOFOL 10 MG/ML
VIAL (ML) INTRAVENOUS
Status: DISCONTINUED | OUTPATIENT
Start: 2020-12-21 | End: 2020-12-21

## 2020-12-21 RX ORDER — KETOROLAC TROMETHAMINE 30 MG/ML
15 INJECTION, SOLUTION INTRAMUSCULAR; INTRAVENOUS ONCE
Status: COMPLETED | OUTPATIENT
Start: 2020-12-21 | End: 2020-12-21

## 2020-12-21 RX ORDER — ACETAMINOPHEN 10 MG/ML
1000 INJECTION, SOLUTION INTRAVENOUS EVERY 8 HOURS
Status: COMPLETED | OUTPATIENT
Start: 2020-12-21 | End: 2020-12-22

## 2020-12-21 RX ORDER — KETOROLAC TROMETHAMINE 30 MG/ML
INJECTION, SOLUTION INTRAMUSCULAR; INTRAVENOUS
Status: DISCONTINUED | OUTPATIENT
Start: 2020-12-21 | End: 2020-12-21

## 2020-12-21 RX ORDER — DEXAMETHASONE SODIUM PHOSPHATE 4 MG/ML
INJECTION, SOLUTION INTRA-ARTICULAR; INTRALESIONAL; INTRAMUSCULAR; INTRAVENOUS; SOFT TISSUE
Status: DISCONTINUED | OUTPATIENT
Start: 2020-12-21 | End: 2020-12-21

## 2020-12-21 RX ORDER — HYDROCODONE BITARTRATE AND ACETAMINOPHEN 5; 325 MG/1; MG/1
1 TABLET ORAL EVERY 4 HOURS PRN
Status: DISCONTINUED | OUTPATIENT
Start: 2020-12-21 | End: 2020-12-22 | Stop reason: HOSPADM

## 2020-12-21 RX ORDER — MEPERIDINE HYDROCHLORIDE 50 MG/ML
INJECTION INTRAMUSCULAR; INTRAVENOUS; SUBCUTANEOUS
Status: DISCONTINUED | OUTPATIENT
Start: 2020-12-21 | End: 2020-12-21

## 2020-12-21 RX ORDER — MIDAZOLAM HYDROCHLORIDE 1 MG/ML
INJECTION, SOLUTION INTRAMUSCULAR; INTRAVENOUS
Status: DISCONTINUED | OUTPATIENT
Start: 2020-12-21 | End: 2020-12-21

## 2020-12-21 RX ORDER — CEFAZOLIN SODIUM 2 G/50ML
SOLUTION INTRAVENOUS
Status: COMPLETED
Start: 2020-12-21 | End: 2020-12-21

## 2020-12-21 RX ORDER — ONDANSETRON 2 MG/ML
4 INJECTION INTRAMUSCULAR; INTRAVENOUS DAILY PRN
Status: DISCONTINUED | OUTPATIENT
Start: 2020-12-21 | End: 2020-12-21 | Stop reason: HOSPADM

## 2020-12-21 RX ORDER — SUCCINYLCHOLINE CHLORIDE 20 MG/ML
INJECTION INTRAMUSCULAR; INTRAVENOUS
Status: DISCONTINUED | OUTPATIENT
Start: 2020-12-21 | End: 2020-12-21

## 2020-12-21 RX ORDER — ONDANSETRON 4 MG/1
8 TABLET, ORALLY DISINTEGRATING ORAL EVERY 8 HOURS PRN
Status: DISCONTINUED | OUTPATIENT
Start: 2020-12-21 | End: 2020-12-22 | Stop reason: HOSPADM

## 2020-12-21 RX ADMIN — ACETAMINOPHEN 1000 MG: 10 INJECTION, SOLUTION INTRAVENOUS at 01:12

## 2020-12-21 RX ADMIN — SODIUM CHLORIDE, POTASSIUM CHLORIDE, SODIUM LACTATE AND CALCIUM CHLORIDE 100 ML/HR: 600; 310; 30; 20 INJECTION, SOLUTION INTRAVENOUS at 09:12

## 2020-12-21 RX ADMIN — HYDROCODONE BITARTRATE AND ACETAMINOPHEN 1 TABLET: 5; 325 TABLET ORAL at 07:12

## 2020-12-21 RX ADMIN — ACETAMINOPHEN 1000 MG: 10 INJECTION, SOLUTION INTRAVENOUS at 10:12

## 2020-12-21 RX ADMIN — SUCCINYLCHOLINE CHLORIDE 100 MG: 20 INJECTION, SOLUTION INTRAMUSCULAR; INTRAVENOUS at 09:12

## 2020-12-21 RX ADMIN — HYDROCODONE BITARTRATE AND ACETAMINOPHEN 1 TABLET: 5; 325 TABLET ORAL at 03:12

## 2020-12-21 RX ADMIN — ONDANSETRON 8 MG: 4 TABLET, ORALLY DISINTEGRATING ORAL at 03:12

## 2020-12-21 RX ADMIN — MORPHINE SULFATE 4 MG: 4 INJECTION, SOLUTION INTRAMUSCULAR; INTRAVENOUS at 05:12

## 2020-12-21 RX ADMIN — KETOROLAC TROMETHAMINE 30 MG: 30 INJECTION, SOLUTION INTRAMUSCULAR at 05:12

## 2020-12-21 RX ADMIN — KETOROLAC TROMETHAMINE 15 MG: 30 INJECTION, SOLUTION INTRAMUSCULAR at 11:12

## 2020-12-21 RX ADMIN — DEXAMETHASONE SODIUM PHOSPHATE 4 MG: 4 INJECTION, SOLUTION INTRAMUSCULAR; INTRAVENOUS at 09:12

## 2020-12-21 RX ADMIN — PROMETHAZINE HYDROCHLORIDE 25 MG: 25 INJECTION INTRAMUSCULAR; INTRAVENOUS at 01:12

## 2020-12-21 RX ADMIN — ROCURONIUM BROMIDE 30 MG: 10 INJECTION, SOLUTION INTRAVENOUS at 09:12

## 2020-12-21 RX ADMIN — CEFAZOLIN SODIUM 2 G: 2 SOLUTION INTRAVENOUS at 09:12

## 2020-12-21 RX ADMIN — MORPHINE SULFATE 4 MG: 4 INJECTION, SOLUTION INTRAMUSCULAR; INTRAVENOUS at 01:12

## 2020-12-21 RX ADMIN — MORPHINE SULFATE 4 MG: 4 INJECTION, SOLUTION INTRAMUSCULAR; INTRAVENOUS at 10:12

## 2020-12-21 RX ADMIN — ONDANSETRON 4 MG: 2 INJECTION INTRAMUSCULAR; INTRAVENOUS at 09:12

## 2020-12-21 RX ADMIN — PROMETHAZINE HYDROCHLORIDE 6.25 MG: 25 INJECTION INTRAMUSCULAR; INTRAVENOUS at 11:12

## 2020-12-21 RX ADMIN — SUGAMMADEX 200 MG: 100 INJECTION, SOLUTION INTRAVENOUS at 11:12

## 2020-12-21 RX ADMIN — MORPHINE SULFATE 2 MG: 4 INJECTION, SOLUTION INTRAMUSCULAR; INTRAVENOUS at 11:12

## 2020-12-21 RX ADMIN — KETOROLAC TROMETHAMINE 30 MG: 30 INJECTION, SOLUTION INTRAMUSCULAR at 09:12

## 2020-12-21 RX ADMIN — SODIUM CHLORIDE, SODIUM LACTATE, POTASSIUM CHLORIDE, AND CALCIUM CHLORIDE 125 ML/HR: .6; .31; .03; .02 INJECTION, SOLUTION INTRAVENOUS at 01:12

## 2020-12-21 RX ADMIN — KETOROLAC TROMETHAMINE 30 MG: 30 INJECTION, SOLUTION INTRAMUSCULAR at 01:12

## 2020-12-21 RX ADMIN — MEPERIDINE HYDROCHLORIDE 50 MG: 50 INJECTION INTRAMUSCULAR; INTRAVENOUS; SUBCUTANEOUS at 09:12

## 2020-12-21 RX ADMIN — ONDANSETRON HYDROCHLORIDE 4 MG: 2 SOLUTION INTRAMUSCULAR; INTRAVENOUS at 11:12

## 2020-12-21 RX ADMIN — PROPOFOL 200 MG: 10 INJECTION, EMULSION INTRAVENOUS at 09:12

## 2020-12-21 RX ADMIN — SIMETHICONE 80 MG: 80 TABLET, CHEWABLE ORAL at 10:12

## 2020-12-21 RX ADMIN — MIDAZOLAM HYDROCHLORIDE 2 MG: 1 INJECTION, SOLUTION INTRAMUSCULAR; INTRAVENOUS at 09:12

## 2020-12-21 RX ADMIN — PROMETHAZINE HYDROCHLORIDE 12.5 MG: 25 INJECTION INTRAMUSCULAR; INTRAVENOUS at 07:12

## 2020-12-21 NOTE — INTERVAL H&P NOTE
The patient has been examined and the H&P has been reviewed:    I concur with the findings and no changes have occurred since H&P was written.    Surgery risks, benefits and alternative options discussed and understood by patient/family.          Active Hospital Problems    Diagnosis  POA    Adnexal mass [N94.89]  Yes      Resolved Hospital Problems   No resolved problems to display.

## 2020-12-21 NOTE — ANESTHESIA PREPROCEDURE EVALUATION
12/21/2020  Heide Dockery is a 25 y.o., female.    Anesthesia Evaluation    I have reviewed the Patient Summary Reports.    I have reviewed the Nursing Notes.    I have reviewed the Medications.     Review of Systems  Anesthesia Hx:  No problems with previous Anesthesia  Neg history of prior surgery. Denies Family Hx of Anesthesia complications.   Denies Personal Hx of Anesthesia complications.   Social:  Smoker    Hematology/Oncology:  Hematology Normal   Oncology Normal     EENT/Dental:EENT/Dental Normal   Cardiovascular:  Cardiovascular Normal     Pulmonary:  Pulmonary Normal    Renal/:  Renal/ Normal     Hepatic/GI:  Hepatic/GI Normal    Musculoskeletal:  Musculoskeletal Normal    Neurological:  Neurology Normal    Endocrine:  Endocrine Normal    Dermatological:  Skin Normal    Psych:  Psychiatric Normal           Physical Exam  General:  Well nourished    Airway/Jaw/Neck:  Airway Findings: Mouth Opening: Normal Tongue: Normal  General Airway Assessment: Adult  Mallampati: II  TM Distance: 4 - 6 cm        Eyes/Ears/Nose:  EYES/EARS/NOSE FINDINGS: Normal   Dental:  DENTAL FINDINGS: Normal   Chest/Lungs:  Chest/Lungs Clear    Heart/Vascular:  Heart Findings: Normal Heart murmur: negative Vascular Findings: Normal    Abdomen:  Abdomen Findings: Normal    Musculoskeletal:  Musculoskeletal Findings: Normal   Skin:  Skin Findings: Normal    Mental Status:  Mental Status Findings: Normal        Anesthesia Plan  Type of Anesthesia, risks & benefits discussed:  Anesthesia Type:  general  Patient's Preference:   Intra-op Monitoring Plan: standard ASA monitors  Intra-op Monitoring Plan Comments:   Post Op Pain Control Plan:   Post Op Pain Control Plan Comments:   Induction:   IV  Beta Blocker:  Patient is not currently on a Beta-Blocker (No further documentation required).       Informed Consent: Patient  understands risks and agrees with Anesthesia plan.  Questions answered. Anesthesia consent signed with patient.  ASA Score: 2     Day of Surgery Review of History & Physical: I have interviewed and examined the patient. I have reviewed the patient's H&P dated:    H&P update referred to the provider.         Ready For Surgery From Anesthesia Perspective.

## 2020-12-21 NOTE — TRANSFER OF CARE
"Anesthesia Transfer of Care Note    Patient: Heide Dockery    Procedure(s) Performed: Procedure(s) (LRB):  SALPINGO-OOPHORECTOMY, LAPAROSCOPIC Possible open (Right)    Patient location: PACU    Anesthesia Type: general    Transport from OR: Transported from OR on room air with adequate spontaneous ventilation    Post pain: adequate analgesia    Post assessment: no apparent anesthetic complications and tolerated procedure well    Post vital signs: stable    Level of consciousness: awake and sedated    Nausea/Vomiting: no nausea/vomiting    Complications: none    Transfer of care protocol was followed      Last vitals:   Visit Vitals  /73   Pulse 75   Temp 36.9 °C (98.5 °F) (Oral)   Resp 14   Ht 5' 6" (1.676 m)   Wt 68 kg (150 lb)   LMP 04/06/2018   SpO2 100%   Breastfeeding No   BMI 24.21 kg/m²     "

## 2020-12-21 NOTE — PLAN OF CARE
Patient arrives to PACU via bed, monitor connected/. PIV intact and infusing  to gravity. Incision site to lower abdomen dressings in place. Frankel cath in place secure to left thigh. Bear hugger at bedside. SCD'S in place.VS'S Resp even and unlabored.  Will continue to monitor.

## 2020-12-21 NOTE — NURSING
1500 Assisted pt up to bathroom to void.  Pt voided 200 cc of clear yellow urine without difficulty.

## 2020-12-21 NOTE — NURSING
1450 Poole discontinued, pt tolerated procedure well. 200 cc of clear yellow urine emptied from poole.

## 2020-12-21 NOTE — OP NOTE
[facility]  [facility] Department  Operative Note    SUMMARY     Date of Procedure: 12/21/2020     Procedure: Procedure(s) (LRB):  SALPINGO-OOPHORECTOMY, open (Right)     Surgeon(s) and Role:     * Katelyn Delacruz MD - Primary     * Woody Alex MD - Assisting        Pre-Operative Diagnosis: Pelvic pain [R10.2]  Adnexal mass [N94.89]    Post-Operative Diagnosis: Post-Op Diagnosis Codes:     * Pelvic pain [R10.2]     * Adnexal mass [N94.89]    Anesthesia: General    Findings: Multi-cystic right adnexal mass about 7-8 cm, adhered to pelvic peritoneum  Normal left adnexa    Description of the Findings of the Procedure:   The patient was taken to the OR, placed in the dorsal lithotomy position.    She was prepped and draped in the normal sterile fashion.   Time out taken with OR team members.  A miniature pfannenstiel incision was made through the skin, transverse fascial incision developed, rectus muscles  in the midline and the peritoneum entered.     The bowel was packed with moist laps.  Jackson retractors were placed for retraction.  The cystic right adnexal mass with the adnexa was identified and elevated with a Sherrill clamp.  It was carefully dissected from the pelvic peritoneum Metzenbaum scissors and cautery.  The infundibulopelvic ligament was ligated with the LigaSure.  The right ureter was visualized and palpated, the field of dissection was ensured to be away from this ureter.  The specimen was then freed and removed, it was sent to pathology for frozen section.  Only 1 cystic area had ruptured during the dissection, clear yellow fluid was noted.  The abdomen/pelvis was thoroughly irrigated and suctioned.  The dissection site was oversewn with Vicryl suture, again taking care to be clear of the ureter.  Hemostasis was noted.  Surgicel powder was placed over the raw surgical edges. All instruments and laps were removed.     Closure of the abdomen with 2 0 Vicryl  running of the peritoneum, chromic suture of the rectus muscles, 0 Vicryl of the fascia, 2 0 plain gut of the subcutaneous tissue.  Skin closure with 4 0 monocryl subcuticular.  The patient tolerated the procedure well. Counts were correct.       Significant Surgical Tasks Conducted by the Assistant(s), if Applicable:  Retraction, counter traction, dissection, suturing, suction, irrigation    Complications: No    Estimated Blood Loss (EBL): 100cc           Implants: * No implants in log *    Specimens:   Right tube, ovary with mass  Benign on verbal frozen section           Condition: Good    Disposition: PACU - hemodynamically stable.    Attestation: I performed the procedure.

## 2020-12-21 NOTE — NURSING
1233 Received care of pt from PACU.  Pt in stable condition.  VSS, easily arousable. No s/s of any distress.  Afebrile, poole to gravity draining clear yellow urine.  Dressing dry, clean and intact with no visible drainage.  No bleeding noted to peripad.  Pt voices no complaints or questions at this time.

## 2020-12-21 NOTE — PLAN OF CARE
12/21/20 1631   Discharge Assessment   Assessment Type Discharge Planning Assessment   Confirmed/corrected address and phone number on facesheet? Yes   Assessment information obtained from? Patient   Expected Length of Stay (days) 1   Communicated expected length of stay with patient/caregiver yes   Prior to hospitilization cognitive status: Alert/Oriented   Prior to hospitalization functional status: Independent   Current cognitive status: Alert/Oriented   Current Functional Status: Independent   Lives With significant other;grandparent(s);child(melissa), dependent   Able to Return to Prior Arrangements yes   Is patient able to care for self after discharge? Yes   Who are your caregiver(s) and their phone number(s)? Timothy Mario significant other [phone]   Patient's perception of discharge disposition home or selfcare   Readmission Within the Last 30 Days no previous admission in last 30 days   Patient currently being followed by outpatient case management? No   Patient currently receives any other outside agency services? No   Equipment Currently Used at Home none   Do you have any problems affording any of your prescribed medications? No   Is the patient taking medications as prescribed?   (not on any medications at this time)   Does the patient receive services at the [facility]? No   Discharge Plan A Home with family   DME Needed Upon Discharge  none   Patient/Family in Agreement with Plan yes   Patient lives at home with her boyfriend; her 3 children ages 3-7 & her grandmother. She is independent at home. States she will have help at home when she is discharged. States she already has a follow up appointment with her doctor. Denies any needs at this time. Will continue to follow for needs.

## 2020-12-21 NOTE — ANESTHESIA PROCEDURE NOTES
Continue hydrocodone as prescribed 1-4 times a day as needed   Intubation  Performed by: Gayatri More CRNA  Authorized by: Hector Cam MD     Intubation:     Induction:  Rapid sequence induction    Intubated:  Postinduction    Mask Ventilation:  Easy mask    Attempts:  1    Attempted By:  CRNA    Method of Intubation:  Direct    Blade:  Deshaun 3    Laryngeal View Grade: Grade I - full view of chords      Difficult Airway Encountered?: No      Complications:  None    Airway Device:  Oral endotracheal tube    Airway Device Size:  7.0    Style/Cuff Inflation:  Cuffed    Tube secured:  23    Secured at:  The teeth    Placement Verified By:  Capnometry    Complicating Factors:  None    Findings Post-Intubation:  BS equal bilateral

## 2020-12-21 NOTE — NURSING
1319 Pt complaining of pain and nausea.  Medicated with Morphine 4mg and Phenergan 12.5 mg per md orders

## 2020-12-22 VITALS
WEIGHT: 150 LBS | HEART RATE: 67 BPM | DIASTOLIC BLOOD PRESSURE: 47 MMHG | SYSTOLIC BLOOD PRESSURE: 101 MMHG | TEMPERATURE: 98 F | OXYGEN SATURATION: 99 % | RESPIRATION RATE: 16 BRPM | HEIGHT: 66 IN | BODY MASS INDEX: 24.11 KG/M2

## 2020-12-22 LAB
BASOPHILS # BLD AUTO: 0.03 K/UL (ref 0–0.2)
BASOPHILS NFR BLD: 0.4 % (ref 0–1.9)
DIFFERENTIAL METHOD: ABNORMAL
EOSINOPHIL # BLD AUTO: 0.1 K/UL (ref 0–0.5)
EOSINOPHIL NFR BLD: 1.1 % (ref 0–8)
ERYTHROCYTE [DISTWIDTH] IN BLOOD BY AUTOMATED COUNT: 11.7 % (ref 11.5–14.5)
HCT VFR BLD AUTO: 29.7 % (ref 37–48.5)
HGB BLD-MCNC: 10.1 G/DL (ref 12–16)
IMM GRANULOCYTES # BLD AUTO: 0.02 K/UL (ref 0–0.04)
IMM GRANULOCYTES NFR BLD AUTO: 0.3 % (ref 0–0.5)
LYMPHOCYTES # BLD AUTO: 1.7 K/UL (ref 1–4.8)
LYMPHOCYTES NFR BLD: 23.6 % (ref 18–48)
MCH RBC QN AUTO: 30.9 PG (ref 27–31)
MCHC RBC AUTO-ENTMCNC: 34 G/DL (ref 32–36)
MCV RBC AUTO: 91 FL (ref 82–98)
MONOCYTES # BLD AUTO: 0.9 K/UL (ref 0.3–1)
MONOCYTES NFR BLD: 13.1 % (ref 4–15)
NEUTROPHILS # BLD AUTO: 4.3 K/UL (ref 1.8–7.7)
NEUTROPHILS NFR BLD: 61.5 % (ref 38–73)
NRBC BLD-RTO: 0 /100 WBC
PLATELET # BLD AUTO: 190 K/UL (ref 150–350)
PMV BLD AUTO: 10.1 FL (ref 9.2–12.9)
RBC # BLD AUTO: 3.27 M/UL (ref 4–5.4)
WBC # BLD AUTO: 7.03 K/UL (ref 3.9–12.7)

## 2020-12-22 PROCEDURE — 36415 COLL VENOUS BLD VENIPUNCTURE: CPT

## 2020-12-22 PROCEDURE — 85025 COMPLETE CBC W/AUTO DIFF WBC: CPT

## 2020-12-22 PROCEDURE — 63600175 PHARM REV CODE 636 W HCPCS: Performed by: OBSTETRICS & GYNECOLOGY

## 2020-12-22 PROCEDURE — 25000003 PHARM REV CODE 250: Performed by: OBSTETRICS & GYNECOLOGY

## 2020-12-22 RX ORDER — IBUPROFEN 800 MG/1
800 TABLET ORAL 3 TIMES DAILY PRN
Qty: 30 TABLET | Refills: 0 | Status: SHIPPED | OUTPATIENT
Start: 2020-12-22 | End: 2021-06-21

## 2020-12-22 RX ORDER — HYDROCODONE BITARTRATE AND ACETAMINOPHEN 10; 325 MG/1; MG/1
1 TABLET ORAL EVERY 4 HOURS PRN
Status: DISCONTINUED | OUTPATIENT
Start: 2020-12-22 | End: 2020-12-22 | Stop reason: HOSPADM

## 2020-12-22 RX ORDER — HYDROCODONE BITARTRATE AND ACETAMINOPHEN 5; 325 MG/1; MG/1
1-2 TABLET ORAL EVERY 4 HOURS PRN
Qty: 24 TABLET | Refills: 0 | Status: SHIPPED | OUTPATIENT
Start: 2020-12-22 | End: 2021-06-21

## 2020-12-22 RX ORDER — DOCUSATE SODIUM 100 MG/1
100 CAPSULE, LIQUID FILLED ORAL 2 TIMES DAILY
Qty: 60 CAPSULE | Refills: 1 | Status: SHIPPED | OUTPATIENT
Start: 2020-12-22 | End: 2021-06-21

## 2020-12-22 RX ADMIN — MORPHINE SULFATE 4 MG: 4 INJECTION, SOLUTION INTRAMUSCULAR; INTRAVENOUS at 03:12

## 2020-12-22 RX ADMIN — MORPHINE SULFATE 4 MG: 4 INJECTION, SOLUTION INTRAMUSCULAR; INTRAVENOUS at 06:12

## 2020-12-22 RX ADMIN — KETOROLAC TROMETHAMINE 30 MG: 30 INJECTION, SOLUTION INTRAMUSCULAR at 06:12

## 2020-12-22 RX ADMIN — ACETAMINOPHEN 1000 MG: 10 INJECTION, SOLUTION INTRAVENOUS at 06:12

## 2020-12-22 RX ADMIN — SIMETHICONE 80 MG: 80 TABLET, CHEWABLE ORAL at 06:12

## 2020-12-22 RX ADMIN — HYDROCODONE BITARTRATE AND ACETAMINOPHEN 1 TABLET: 10; 325 TABLET ORAL at 07:12

## 2020-12-22 RX ADMIN — KETOROLAC TROMETHAMINE 30 MG: 30 INJECTION, SOLUTION INTRAMUSCULAR at 12:12

## 2020-12-22 NOTE — DISCHARGE INSTRUCTIONS
oop  Discharge Instructions for Oophorectomy  You had a procedure called oophorectomy. This is the surgical removal of one or more ovaries. These walnut-sized organs in your pelvic area make and release the eggs. The eggs can grow to become a baby when combined with a mans sperm. Ovaries also make hormones that regulate your menstrual cycle (also called your period). Your periods may stop if both ovaries were removed if you were still menstruating before the surgery. You may have other symptoms of menopause as well, such as hot flashes. A hysterectomy to remove the uterus is often done with oophorectomy.   Activity  · Rest when you are tired.  · Take your medicine exactly as instructed by your doctor.  · Continue the coughing and deep breathing exercises that you learned in the hospital.  · Listen to your body. If an activity causes pain, stop.  · Limit your activity for 4 to 6 weeks.  · Dont lift anything heavier than 10 pounds.  · Don't do strenuous activities, such as mowing the lawn, vacuuming, or playing sports.  · Limit your activity to regular short walks. Gradually increase your pace and distance as you feel able.  · Dont drive for until you are comfortable without taking narcotics. This may take up to 2 weeks. You may ride in a car for short trips.  Other home care  · Dont put anything in your vagina until your doctor says its OK.  ¨ Dont douche.  ¨ Dont use tampons.  ¨ Dont have sex.  · Shower as needed.  ¨ Wash your incision gently with mild soap and warm water.  ¨ Keep your incision clean and dry.  · Check your temperature each day for 1 week after your surgery.  · Eat a healthy, well-balanced diet.  · Avoid constipation.  ¨ Use laxatives, stool softeners, or enemas as directed by your doctor.  ¨ Eat more high-fiber foods.  ¨ Drink 6 to 8 glasses of water every day, unless directed otherwise.  Follow-up care  Make a follow-up appointment as directed by our staff.     When to call your  doctor  Call your doctor right away if you have any of the following:  · Fever above 100.4°F (38°C) or chills  · Bright red bleeding or foul smelling discharge from your vagina  · Trouble urinating, or burning during urination  · Severe abdominal pain or bloating  · Redness, swelling, or draining at your incision site  · Shortness of breath or chest pain  · Nausea and vomiting  · Increasing pain with or without activity   Date Last Reviewed: 5/19/2015 © 2000-2017 [employer] [address] [place] [address], [place] [address]. All rights reserved. This information is not intended as a substitute for professional medical care. Always follow your healthcare professional's instructions.        Managing Pain After Surgery    Once youre home after surgery, you may have some pain, since even minor surgery causes swelling and breakdown of tissue. When it comes to managing pain, the tips you may have learned in the hospital also work at home. To get the best pain relief possible, remember the points below.  Special note: Be sure to follow any specific post-surgery instructions from your surgeon or nurse.   Use your medicine only as directed  · If your pain is not relieved or if it gets worse, call your healthcare provider.  · If pain lessens, try taking your medicine less often or in smaller doses.  · Non-pharmacological methods of pain relief are also beneficial and may help your pain medicine work better. Ask your healthcare provider for suggestions.  Remember that medicines need time to work  · Most pain relievers taken by mouth need at least 20 to 30 minutes to start to work. They may not reach their maximum effect for close to an hour.   · Take pain medicine at regular times as directed. Dont wait until the pain gets bad to take it.  Time your medicine  · Try to time your medicine so that you take it before starting an activity, such as dressing or sitting at the table for dinner.  · Taking your medicine at  night may help you get a good nights rest.  Eat lots of fruit and vegetables  · Constipation is a common side effect with some pain medicines. Eating fruit, vegetables, and other foods high in fiber can help.  · Drink plenty of fluids.  · Talk to your healthcare provider about taking a preventive bowel regimen.  Don't drink alcohol while taking pain medicine  · Mixing alcohol and pain medicine can cause dizziness and slow your respiratory system. It can even be fatal.     Don't drive or use machinery while taking pain medicines that can cause drowsiness.   Note these other precautions  · Ask your healthcare provider or pharmacist how to get rid of your pain medicines safely when you stop using them.  · Never share your pain medicines with anyone.  · Store your medicines in a safe place so they cant be stolen. If you think your medicine has been stolen or lost, tell your healthcare provider right away.  Date Last Reviewed: 5/1/2017  © 3387-9518 [employer]. [address] [place] [address], [place] [address]. All rights reserved. This information is not intended as a substitute for professional medical care. Always follow your healthcare professional's instructions.

## 2020-12-22 NOTE — NURSING
Discharge and follow up instructions reviewed with pt and significant other.  No questions or concerns voiced at this time.  IV discontinued with catheter intact.  PT tolerated well.  Dressing on abdominal incision was covered in serous drainage with some blood.  Dressing changed.  Incision was well approximated and steri strips were in place.  No s/s of any infection noted. Pt is to change for discharge and let staff know when she is ready to go

## 2020-12-22 NOTE — DISCHARGE SUMMARY
[facility]   Discharge Summary      Patient Name: Heide Dockery  MRN: [id number]  Admission Date: 2020  Hospital Length of Stay: 1 days  Discharge Date and Time: 2020    Attending Physician: Katelyn Delacruz MD   Discharging Provider: Katelyn Delacruz MD  Primary Care Provider: Primary Doctor No    HPI: 24yo  here for laparotomy salpingo-oophorectomy. Patient with large adnexal mass causing pelvic pain.       H/o pelvic pain. Pain is on right.   Was seen for this issue 2 months ago. Had BV, took flagyl. GC/CT urine culture were negative.  States pain has worsened since then.   S/p TVH for pain 2018     Denies other GI issues, saw GI last year for unexplained weight loss, states she was down to about 100 lb, now has gained back.      US shows 8.5 x 9.5 cm complex right ovarian mass  CA-125 normal    Procedure(s) (LRB):  SALPINGO-OOPHORECTOMY, LAPAROSCOPIC Possible open (Right)     Hospital Course:   The patient tolerated the procedure well.  She was meeting goals for discharge on postoperative day 1.      Final Active Diagnoses:    Diagnosis Date Noted POA    PRINCIPAL PROBLEM:  Adnexal mass [N94.89] 2020 Yes      Problems Resolved During this Admission:        Labs:   CBC   Recent Labs   Lab 20  0609   WBC 7.03   HGB 10.1*   HCT 29.7*          Pending Diagnostic Studies:     Procedure Component Value Units Date/Time    Specimen to Pathology, Surgery Gynecology and Obstetrics [[id number]] Collected: 20 1053    Order Status: Sent Lab Status: In process Updated: 20 1138          Discharged Condition: good    Disposition: Home or Self Care    Follow Up:  Follow-up Information     Katelyn Delacruz MD In 2 weeks.    Specialty: Obstetrics and Gynecology  Why: post op  Contact information:  [address] [place] [id number] [phone]                 Patient Instructions:      Lifting restrictions     Pelvic Rest     Notify your health care provider if you  experience any of the following:  temperature >100.4     Notify your health care provider if you experience any of the following:  persistent nausea and vomiting or diarrhea     Notify your health care provider if you experience any of the following:  severe uncontrolled pain     Notify your health care provider if you experience any of the following:  redness, tenderness, or signs of infection (pain, swelling, redness, odor or green/yellow discharge around incision site)     Notify your health care provider if you experience any of the following:  difficulty breathing or increased cough     Notify your health care provider if you experience any of the following:  severe persistent headache     Notify your health care provider if you experience any of the following:  worsening rash     Notify your health care provider if you experience any of the following:  persistent dizziness, light-headedness, or visual disturbances     Notify your health care provider if you experience any of the following:  increased confusion or weakness     Activity as tolerated     Medications:  Current Discharge Medication List      START taking these medications    Details   docusate sodium (COLACE) 100 MG capsule Take 1 capsule (100 mg total) by mouth 2 (two) times daily.  Qty: 60 capsule, Refills: 1         CONTINUE these medications which have CHANGED    Details   HYDROcodone-acetaminophen (NORCO) 5-325 mg per tablet Take 1-2 tablets by mouth every 4 (four) hours as needed for Pain.  Qty: 24 tablet, Refills: 0    Comments: n/a       ibuprofen (ADVIL,MOTRIN) 800 MG tablet Take 1 tablet (800 mg total) by mouth 3 (three) times daily as needed for Pain.  Qty: 30 tablet, Refills: 0         CONTINUE these medications which have NOT CHANGED    Details   metroNIDAZOLE (METROGEL) 0.75 % vaginal gel Insert 1 applicator full vaginally q night x 3 nights, skip 3 nights and repeat for 3 weeks  Qty: 70 g, Refills: 3    Associated Diagnoses:  Bacterial vaginosis             Katelyn Delacruz MD    [facility]

## 2020-12-22 NOTE — NURSING
Pt is able to ambulate to restroom with no assistance from nurse.  Voiding without difficulty.  IV in Right AC became infiltrated.  New IV started to L wrist per JADE Jaffe RN x 1 attempt.  Pt medicated for complaints of pain

## 2020-12-22 NOTE — ANESTHESIA POSTPROCEDURE EVALUATION
Anesthesia Post Evaluation    Patient: Heide Dockery    Procedure(s) Performed: Procedure(s) (LRB):  SALPINGO-OOPHORECTOMY, LAPAROSCOPIC Possible open (Right)    Final Anesthesia Type: general      Patient location during evaluation: PACU  Patient participation: Yes- Able to Participate  Level of consciousness: awake and awake and alert  Post-procedure vital signs: reviewed and stable  Pain management: adequate  Airway patency: patent    PONV status at discharge: No PONV  Anesthetic complications: no      Cardiovascular status: blood pressure returned to baseline  Respiratory status: unassisted and spontaneous ventilation  Hydration status: euvolemic  Follow-up not needed.          Vitals Value Taken Time   BP 97/51 12/21/20 1617   Temp 36.7 °C (98.1 °F) 12/21/20 1617   Pulse 66 12/21/20 1617   Resp 18 12/21/20 1738   SpO2 99 % 12/21/20 1617         Event Time   Out of Recovery 12:37:02         Pain/Carola Score: Pain Rating Prior to Med Admin: 6 (12/21/2020  5:38 PM)  Carola Score: 9 (12/21/2020 12:30 PM)

## 2020-12-24 LAB
FINAL PATHOLOGIC DIAGNOSIS: NORMAL
FROZEN SECTION DIAGNOSIS: NORMAL
FROZEN SECTION FOOTNOTE: NORMAL
GROSS: NORMAL
Lab: NORMAL

## 2021-06-21 PROBLEM — B37.31 YEAST VAGINITIS: Status: ACTIVE | Noted: 2021-06-21

## 2021-07-01 ENCOUNTER — PATIENT MESSAGE (OUTPATIENT)
Dept: ADMINISTRATIVE | Facility: OTHER | Age: 26
End: 2021-07-01

## 2021-07-05 ENCOUNTER — HOSPITAL ENCOUNTER (EMERGENCY)
Facility: HOSPITAL | Age: 26
Discharge: HOME | End: 2021-07-05
Payer: MEDICAID

## 2021-07-05 VITALS
WEIGHT: 140 LBS | SYSTOLIC BLOOD PRESSURE: 104 MMHG | TEMPERATURE: 99 F | DIASTOLIC BLOOD PRESSURE: 70 MMHG | HEIGHT: 66 IN | HEART RATE: 68 BPM | BODY MASS INDEX: 22.5 KG/M2 | OXYGEN SATURATION: 99 % | RESPIRATION RATE: 18 BRPM

## 2021-07-05 DIAGNOSIS — S30.1XXA CONTUSION OF ABDOMINAL WALL, INITIAL ENCOUNTER: Primary | ICD-10-CM

## 2021-07-05 PROCEDURE — 74176 CT ABDOMEN PELVIS WITHOUT CONTRAST: ICD-10-PCS | Mod: 26,,, | Performed by: RADIOLOGY

## 2021-07-05 PROCEDURE — 74176 CT ABD & PELVIS W/O CONTRAST: CPT | Mod: TC

## 2021-07-05 PROCEDURE — 74176 CT ABD & PELVIS W/O CONTRAST: CPT | Mod: 26,,, | Performed by: RADIOLOGY

## 2021-07-05 PROCEDURE — 99284 EMERGENCY DEPT VISIT MOD MDM: CPT

## 2021-07-08 DIAGNOSIS — M25.511 RIGHT SHOULDER PAIN: Primary | ICD-10-CM

## 2021-07-16 ENCOUNTER — CLINICAL SUPPORT (OUTPATIENT)
Dept: REHABILITATION | Facility: HOSPITAL | Age: 26
End: 2021-07-16
Payer: MEDICAID

## 2021-07-16 DIAGNOSIS — M25.511 CHRONIC RIGHT SHOULDER PAIN: ICD-10-CM

## 2021-07-16 DIAGNOSIS — G89.29 CHRONIC RIGHT SHOULDER PAIN: ICD-10-CM

## 2021-07-16 DIAGNOSIS — M25.511 RIGHT SHOULDER PAIN: ICD-10-CM

## 2021-07-16 PROCEDURE — 97166 OT EVAL MOD COMPLEX 45 MIN: CPT | Mod: PN

## 2021-07-16 PROCEDURE — 97110 THERAPEUTIC EXERCISES: CPT | Mod: PN

## 2021-07-21 ENCOUNTER — CLINICAL SUPPORT (OUTPATIENT)
Dept: REHABILITATION | Facility: HOSPITAL | Age: 26
End: 2021-07-21
Payer: MEDICAID

## 2021-07-21 DIAGNOSIS — G89.29 CHRONIC RIGHT SHOULDER PAIN: Primary | ICD-10-CM

## 2021-07-21 DIAGNOSIS — M25.511 CHRONIC RIGHT SHOULDER PAIN: Primary | ICD-10-CM

## 2021-07-21 PROCEDURE — 97110 THERAPEUTIC EXERCISES: CPT | Mod: PN

## 2021-07-21 PROCEDURE — 97140 MANUAL THERAPY 1/> REGIONS: CPT | Mod: PN

## 2021-07-23 ENCOUNTER — CLINICAL SUPPORT (OUTPATIENT)
Dept: REHABILITATION | Facility: HOSPITAL | Age: 26
End: 2021-07-23
Payer: MEDICAID

## 2021-07-23 DIAGNOSIS — M25.511 CHRONIC RIGHT SHOULDER PAIN: Primary | ICD-10-CM

## 2021-07-23 DIAGNOSIS — G89.29 CHRONIC RIGHT SHOULDER PAIN: Primary | ICD-10-CM

## 2021-07-23 PROCEDURE — 97110 THERAPEUTIC EXERCISES: CPT | Mod: PN

## 2021-07-23 PROCEDURE — 97140 MANUAL THERAPY 1/> REGIONS: CPT | Mod: PN

## 2021-07-28 ENCOUNTER — CLINICAL SUPPORT (OUTPATIENT)
Dept: REHABILITATION | Facility: HOSPITAL | Age: 26
End: 2021-07-28
Payer: MEDICAID

## 2021-07-28 DIAGNOSIS — G89.29 CHRONIC RIGHT SHOULDER PAIN: Primary | ICD-10-CM

## 2021-07-28 DIAGNOSIS — M25.511 CHRONIC RIGHT SHOULDER PAIN: Primary | ICD-10-CM

## 2021-07-28 PROCEDURE — 97110 THERAPEUTIC EXERCISES: CPT | Mod: PN

## 2021-07-28 PROCEDURE — 97032 APPL MODALITY 1+ESTIM EA 15: CPT | Mod: PN

## 2021-07-30 ENCOUNTER — CLINICAL SUPPORT (OUTPATIENT)
Dept: REHABILITATION | Facility: HOSPITAL | Age: 26
End: 2021-07-30
Payer: MEDICAID

## 2021-07-30 DIAGNOSIS — M25.511 CHRONIC RIGHT SHOULDER PAIN: Primary | ICD-10-CM

## 2021-07-30 DIAGNOSIS — G89.29 CHRONIC RIGHT SHOULDER PAIN: Primary | ICD-10-CM

## 2021-07-30 PROCEDURE — 97032 APPL MODALITY 1+ESTIM EA 15: CPT | Mod: PN

## 2021-07-30 PROCEDURE — 97110 THERAPEUTIC EXERCISES: CPT | Mod: PN

## 2021-08-04 ENCOUNTER — CLINICAL SUPPORT (OUTPATIENT)
Dept: REHABILITATION | Facility: HOSPITAL | Age: 26
End: 2021-08-04
Payer: MEDICAID

## 2021-08-04 DIAGNOSIS — M25.511 CHRONIC RIGHT SHOULDER PAIN: Primary | ICD-10-CM

## 2021-08-04 DIAGNOSIS — G89.29 CHRONIC RIGHT SHOULDER PAIN: Primary | ICD-10-CM

## 2021-08-04 PROCEDURE — 97032 APPL MODALITY 1+ESTIM EA 15: CPT | Mod: PN

## 2021-08-04 PROCEDURE — 97140 MANUAL THERAPY 1/> REGIONS: CPT | Mod: PN

## 2021-08-04 PROCEDURE — 97110 THERAPEUTIC EXERCISES: CPT | Mod: PN

## 2021-08-06 ENCOUNTER — CLINICAL SUPPORT (OUTPATIENT)
Dept: REHABILITATION | Facility: HOSPITAL | Age: 26
End: 2021-08-06
Payer: MEDICAID

## 2021-08-06 DIAGNOSIS — G89.29 CHRONIC RIGHT SHOULDER PAIN: Primary | ICD-10-CM

## 2021-08-06 DIAGNOSIS — M25.511 CHRONIC RIGHT SHOULDER PAIN: Primary | ICD-10-CM

## 2021-08-06 PROCEDURE — 97110 THERAPEUTIC EXERCISES: CPT | Mod: PN

## 2021-08-06 PROCEDURE — 97032 APPL MODALITY 1+ESTIM EA 15: CPT | Mod: PN

## 2021-08-06 PROCEDURE — 97140 MANUAL THERAPY 1/> REGIONS: CPT | Mod: PN

## 2021-08-09 ENCOUNTER — CLINICAL SUPPORT (OUTPATIENT)
Dept: REHABILITATION | Facility: HOSPITAL | Age: 26
End: 2021-08-09
Payer: MEDICAID

## 2021-08-09 DIAGNOSIS — G89.29 CHRONIC RIGHT SHOULDER PAIN: Primary | ICD-10-CM

## 2021-08-09 DIAGNOSIS — M25.511 CHRONIC RIGHT SHOULDER PAIN: Primary | ICD-10-CM

## 2021-08-09 PROCEDURE — 97032 APPL MODALITY 1+ESTIM EA 15: CPT | Mod: PN

## 2021-08-09 PROCEDURE — 97140 MANUAL THERAPY 1/> REGIONS: CPT | Mod: PN

## 2021-08-09 PROCEDURE — 97110 THERAPEUTIC EXERCISES: CPT | Mod: PN

## 2021-10-15 DIAGNOSIS — M25.511 SHOULDER PAIN, RIGHT: Primary | ICD-10-CM

## 2021-10-15 DIAGNOSIS — M25.521 ELBOW PAIN, RIGHT: ICD-10-CM

## 2021-10-29 ENCOUNTER — CLINICAL SUPPORT (OUTPATIENT)
Dept: REHABILITATION | Facility: HOSPITAL | Age: 26
End: 2021-10-29
Attending: ORTHOPAEDIC SURGERY
Payer: MEDICAID

## 2021-10-29 DIAGNOSIS — M25.521 ELBOW PAIN, RIGHT: ICD-10-CM

## 2021-10-29 DIAGNOSIS — G89.29 CHRONIC RIGHT SHOULDER PAIN: Primary | ICD-10-CM

## 2021-10-29 DIAGNOSIS — M25.511 CHRONIC RIGHT SHOULDER PAIN: Primary | ICD-10-CM

## 2021-10-29 DIAGNOSIS — M25.511 SHOULDER PAIN, RIGHT: ICD-10-CM

## 2021-10-29 PROCEDURE — 97032 APPL MODALITY 1+ESTIM EA 15: CPT | Mod: PN

## 2021-10-29 PROCEDURE — 97166 OT EVAL MOD COMPLEX 45 MIN: CPT | Mod: PN

## 2021-10-29 PROCEDURE — 97140 MANUAL THERAPY 1/> REGIONS: CPT | Mod: PN

## 2021-11-09 ENCOUNTER — CLINICAL SUPPORT (OUTPATIENT)
Dept: REHABILITATION | Facility: HOSPITAL | Age: 26
End: 2021-11-09
Attending: ORTHOPAEDIC SURGERY
Payer: MEDICAID

## 2021-11-09 DIAGNOSIS — M25.511 CHRONIC RIGHT SHOULDER PAIN: Primary | ICD-10-CM

## 2021-11-09 DIAGNOSIS — G89.29 CHRONIC RIGHT SHOULDER PAIN: Primary | ICD-10-CM

## 2021-11-09 PROCEDURE — 97110 THERAPEUTIC EXERCISES: CPT | Mod: PN

## 2021-11-09 PROCEDURE — 97032 APPL MODALITY 1+ESTIM EA 15: CPT | Mod: PN

## 2022-08-11 ENCOUNTER — HOSPITAL ENCOUNTER (EMERGENCY)
Facility: HOSPITAL | Age: 27
Discharge: HOME | End: 2022-08-11
Attending: EMERGENCY MEDICINE
Payer: MEDICAID

## 2022-08-11 VITALS
OXYGEN SATURATION: 98 % | HEIGHT: 66 IN | BODY MASS INDEX: 22.5 KG/M2 | RESPIRATION RATE: 18 BRPM | SYSTOLIC BLOOD PRESSURE: 125 MMHG | WEIGHT: 140 LBS | DIASTOLIC BLOOD PRESSURE: 78 MMHG | HEART RATE: 88 BPM | TEMPERATURE: 101 F

## 2022-08-11 DIAGNOSIS — U07.1 COVID-19: Primary | ICD-10-CM

## 2022-08-11 LAB — SARS-COV-2 RDRP RESP QL NAA+PROBE: POSITIVE

## 2022-08-11 PROCEDURE — U0002 COVID-19 LAB TEST NON-CDC: HCPCS | Performed by: EMERGENCY MEDICINE

## 2022-08-11 PROCEDURE — 99283 EMERGENCY DEPT VISIT LOW MDM: CPT

## 2022-08-11 PROCEDURE — 25000003 PHARM REV CODE 250: Performed by: NURSE PRACTITIONER

## 2022-08-11 RX ORDER — ONDANSETRON 4 MG/1
4 TABLET, ORALLY DISINTEGRATING ORAL EVERY 6 HOURS PRN
Qty: 10 TABLET | Refills: 0 | Status: SHIPPED | OUTPATIENT
Start: 2022-08-11 | End: 2023-07-26

## 2022-08-11 RX ORDER — ACETAMINOPHEN 500 MG
1000 TABLET ORAL
Status: COMPLETED | OUTPATIENT
Start: 2022-08-11 | End: 2022-08-11

## 2022-08-11 RX ORDER — ONDANSETRON 4 MG/1
4 TABLET, ORALLY DISINTEGRATING ORAL
Status: COMPLETED | OUTPATIENT
Start: 2022-08-11 | End: 2022-08-11

## 2022-08-11 RX ADMIN — ACETAMINOPHEN 1000 MG: 500 TABLET ORAL at 12:08

## 2022-08-11 RX ADMIN — ONDANSETRON 4 MG: 4 TABLET, ORALLY DISINTEGRATING ORAL at 01:08

## 2022-08-11 NOTE — ED PROVIDER NOTES
Encounter Date: 8/11/2022       History     Chief Complaint   Patient presents with    COVID-19 Concerns     Patient reports she woke up this morning with chills, body aches, and headache. Patient reports her son tested positive for COVID on Friday.     Chills     27-year-old female presents with fever, body aches, chills, headache that started today.  She said that her son tested positive for COVID this past Friday.  She denies any sore throat, nasal congestion, cough, shortness of breath, chest pain.  She does report some nausea and vomiting today.  She states she vomited once.        Review of patient's allergies indicates:  No Known Allergies  Past Medical History:   Diagnosis Date    Mental disorder      Past Surgical History:   Procedure Laterality Date    abdominal lap      ESOPHAGOGASTRODUODENOSCOPY N/A 9/24/2019    Procedure: ESOPHAGOGASTRODUODENOSCOPY (EGD);  Surgeon: Dmitry Segura MD;  Location: [facility] ENDO;  Service: General;  Laterality: N/A;    HYSTERECTOMY      SALPINGOOPHORECTOMY Right 12/21/2020    Procedure: SALPINGO-OOPHORECTOMY, LAPAROSCOPIC Possible open;  Surgeon: Katelyn Delacruz MD;  Location: [facility] OR;  Service: OB/GYN;  Laterality: Right;    TUBAL LIGATION      WISDOM TOOTH EXTRACTION       Family History   Problem Relation Age of Onset    No Known Problems Mother     No Known Problems Sister     No Known Problems Brother     Breast cancer Maternal Grandmother      Social History     Tobacco Use    Smoking status: Current Every Day Smoker     Packs/day: 0.50     Years: 10.00     Pack years: 5.00    Smokeless tobacco: Current User   Substance Use Topics    Alcohol use: Yes     Comment: occ    Drug use: Never     Review of Systems   Constitutional: Positive for fever.   HENT: Negative.    Respiratory: Negative for apnea, cough, choking, chest tightness, shortness of breath, wheezing and stridor.    Cardiovascular: Negative for chest pain, palpitations and leg swelling.    Gastrointestinal: Positive for vomiting. Negative for abdominal pain, constipation and nausea.   Genitourinary: Negative for dysuria.   Musculoskeletal: Positive for arthralgias and myalgias.   Skin: Negative for rash.   Neurological: Positive for headaches.   All other systems reviewed and are negative.      Physical Exam     Initial Vitals [08/11/22 1147]   BP Pulse Resp Temp SpO2   130/75 109 18 (!) 100.5 °F (38.1 °C) 100 %      MAP       --         Physical Exam    Nursing note and vitals reviewed.  Constitutional: She appears well-developed and well-nourished. She is not diaphoretic.   HENT:   Head: Normocephalic and atraumatic.   Eyes: EOM are normal. Pupils are equal, round, and reactive to light. Right eye exhibits no discharge. Left eye exhibits no discharge.   Neck:   Normal range of motion.  Cardiovascular: Normal rate and regular rhythm. Exam reveals no gallop and no friction rub.    No murmur heard.  Pulmonary/Chest: Breath sounds normal. No respiratory distress. She has no wheezes. She has no rhonchi. She has no rales. She exhibits no tenderness.   Abdominal: She exhibits no distension.   Musculoskeletal:      Cervical back: Normal range of motion.     Neurological: She is alert and oriented to person, place, and time. She has normal strength.   Skin: Skin is warm and dry.   Psychiatric: She has a normal mood and affect.         ED Course   Procedures  Labs Reviewed   SARS-COV-2 RNA AMPLIFICATION, QUAL - Abnormal; Notable for the following components:       Result Value    SARS-CoV-2 RNA, Amplification, Qual Positive (*)     All other components within normal limits    Narrative:     Is the patient symptomatic?->Yes          Imaging Results    None          Medications   ondansetron disintegrating tablet 4 mg (has no administration in time range)   acetaminophen tablet 1,000 mg (1,000 mg Oral Given 8/11/22 1248)     Medical Decision Making:   Differential Diagnosis:   Covid-19, URI, viral  syndrome  ED Management:  27-year-old presents with bodies, headache, fever and vomiting x1 day.  She tested positive for COVID-19 today.  No adventitious sounds on lung exam.  Vital signs are stable.  Not in any distress.  Quarantine for 5 days. You may come out of quarantine on day 6 if you are overall feeling better and you have not had fever for 24 hours prior. Wear a mask around others for a full 10 days. Hydrate well. Rotate tylenol and ibuprofen as needed for fever/ body aches. Return to the ED for any shortness of breath, chest pain, or any worsening symptoms or concerns at all.                         Clinical Impression:   Final diagnoses:  [U07.1] COVID-19 (Primary)          ED Disposition Condition    Discharge Stable        ED Prescriptions     Medication Sig Dispense Start Date End Date Auth. Provider    ondansetron (ZOFRAN-ODT) 4 MG TbDL Take 1 tablet (4 mg total) by mouth every 6 (six) hours as needed (nausea). 10 tablet 8/11/2022  Nhi Rivers NP        Follow-up Information    None          Nhi Rivers NP  08/11/22 3877

## 2022-08-11 NOTE — ED NOTES
Patient placed in ED lobby accompanied by boyfriend, Timothy, to wait for available ED bed. Patient educated on wait time. Verbalized understanding.

## 2022-08-11 NOTE — DISCHARGE INSTRUCTIONS
Quarantine for 5 days. You may come out of quarantine on day 6 if you are overall feeling better and you have not had fever for 24 hours prior. Wear a mask around others for a full 10 days. Hydrate well. Rotate tylenol and ibuprofen as needed for fever/ body aches. Return to the ED for any shortness of breath, chest pain, or any worsening symptoms or concerns at all.

## 2023-03-08 ENCOUNTER — OFFICE VISIT (OUTPATIENT)
Dept: URGENT CARE | Facility: CLINIC | Age: 28
End: 2023-03-08
Payer: MEDICAID

## 2023-03-08 VITALS
RESPIRATION RATE: 18 BRPM | DIASTOLIC BLOOD PRESSURE: 68 MMHG | WEIGHT: 140 LBS | HEART RATE: 84 BPM | HEIGHT: 66 IN | OXYGEN SATURATION: 99 % | SYSTOLIC BLOOD PRESSURE: 116 MMHG | TEMPERATURE: 98 F | BODY MASS INDEX: 22.5 KG/M2

## 2023-03-08 DIAGNOSIS — S61.212A LACERATION OF RIGHT MIDDLE FINGER WITHOUT FOREIGN BODY WITHOUT DAMAGE TO NAIL, INITIAL ENCOUNTER: Primary | ICD-10-CM

## 2023-03-08 PROCEDURE — 12042 LACERATION REPAIR: ICD-10-PCS | Mod: S$GLB,,,

## 2023-03-08 PROCEDURE — 99204 OFFICE O/P NEW MOD 45 MIN: CPT | Mod: 25,S$GLB,,

## 2023-03-08 PROCEDURE — 99204 PR OFFICE/OUTPT VISIT, NEW, LEVL IV, 45-59 MIN: ICD-10-PCS | Mod: 25,S$GLB,,

## 2023-03-08 PROCEDURE — 12042 INTMD RPR N-HF/GENIT2.6-7.5: CPT | Mod: S$GLB,,,

## 2023-03-08 RX ORDER — LIDOCAINE HYDROCHLORIDE 10 MG/ML
1 INJECTION INFILTRATION; PERINEURAL
Status: COMPLETED | OUTPATIENT
Start: 2023-03-08 | End: 2023-03-08

## 2023-03-08 RX ADMIN — LIDOCAINE HYDROCHLORIDE 1 ML: 10 INJECTION INFILTRATION; PERINEURAL at 11:03

## 2023-03-08 NOTE — PROGRESS NOTES
"Subjective:       Patient ID: Heide Dockery is a 28 y.o. female.    Vitals:  height is 5' 6" (1.676 m) and weight is 63.5 kg (140 lb). Her oral temperature is 98.3 °F (36.8 °C). Her blood pressure is 116/68 and her pulse is 84. Her respiration is 18 and oxygen saturation is 99%.     Chief Complaint: Hand Injury    This is a 28 y.o. female who presents today with a chief complaint of Right middle finger.  Patient presents with Hand Injury. States cutting tomatoes when she cut her Right Middle Finger. Pt has full movement and sensation to all digits. Pt denies any numbness or tingling pt states that she is up to date on her tetanus shot. Pt states that the knife was clean at the time the laceration occurred.         Hand Injury   Her dominant hand is their right hand. The incident occurred less than 1 hour ago. The incident occurred at home. There was no injury mechanism. The pain is present in the right hand. The quality of the pain is described as aching and shooting. The pain does not radiate. The pain is at a severity of 8/10. The pain is moderate. The pain has been Constant since the incident. Nothing aggravates the symptoms. She has tried nothing for the symptoms. The treatment provided no relief.     Constitution: Negative.   HENT: Negative.     Neck: neck negative.   Cardiovascular: Negative.    Eyes: Negative.    Respiratory: Negative.     Gastrointestinal: Negative.    Endocrine: negative.   Genitourinary: Negative.    Musculoskeletal: Negative.    Skin:  Positive for laceration.   Allergic/Immunologic: Negative.    Neurological: Negative.    Hematologic/Lymphatic: Negative.    Psychiatric/Behavioral: Negative.           Objective:      Physical Exam   Constitutional: She is oriented to person, place, and time. normal  HENT:   Head: Normocephalic and atraumatic.   Ears:   Right Ear: Tympanic membrane, external ear and ear canal normal.   Left Ear: Tympanic membrane, external ear and ear canal normal. "   Nose: Nose normal.   Mouth/Throat: Mucous membranes are moist. Oropharynx is clear.   Eyes: Conjunctivae are normal. Pupils are equal, round, and reactive to light. Extraocular movement intact   Neck: Neck supple.   Cardiovascular: Normal rate, regular rhythm, normal heart sounds and normal pulses.   Pulmonary/Chest: Effort normal and breath sounds normal.   Abdominal: Normal appearance and bowel sounds are normal. Soft.   Musculoskeletal: Normal range of motion.         General: Normal range of motion.      Right hand: She exhibits normal capillary refill. Right middle finger: Exhibits bleeding and tenderness (laceration). Injuries: laceration. Motor /Testing: The patient has normal right wrist strength.      Left hand: She exhibits normal capillary refill. Motor /Testing: The patient has normal left wrist strength.   Neurological: no focal deficit. She is alert and oriented to person, place, and time.   Skin: Skin is warm. Capillary refill takes less than 2 seconds.   Psychiatric: Her behavior is normal. Mood, judgment and thought content normal.   Nursing note and vitals reviewed.      Past medical history and current medications reviewed.       Assessment:   X ray appears normal      1. Laceration of right middle finger without foreign body without damage to nail, initial encounter              Plan:         Laceration of right middle finger without foreign body without damage to nail, initial encounter  -     X-Ray Finger 2 or More Views Right; Future; Expected date: 03/08/2023  -     LIDOcaine HCL 10 mg/ml (1%) injection 1 mL  -     Laceration repair; Future  -     Laceration Repair             There are no Patient Instructions on file for this visit.

## 2023-03-08 NOTE — PATIENT INSTRUCTIONS
Keep areas clean and dry.  Avoid scratching areas and perform good hand hygiene.    Keep the areas covered and avoid contact with others.  Follow-up with PCP or dermatology if no improvement in symptoms or for any worsening of symptoms.   Sutures need to be removed in 7 days

## 2023-03-08 NOTE — PROCEDURES
"Laceration Repair    Date/Time: 3/8/2023 10:30 AM  Performed by: Rita Claros NP  Authorized by: Rita Claros NP   Consent Done: Yes  Consent: Verbal consent obtained.  Risks and benefits: risks, benefits and alternatives were discussed  Consent given by: patient  Patient understanding: patient states understanding of the procedure being performed  Patient consent: the patient's understanding of the procedure matches consent given  Procedure consent: procedure consent matches procedure scheduled  Relevant documents: relevant documents present and verified  Site marked: the operative site was marked  Imaging studies: imaging studies available  Patient identity confirmed: , name and verbally with patient  Time out: Immediately prior to procedure a "time out" was called to verify the correct patient, procedure, equipment, support staff and site/side marked as required.  Body area: upper extremity  Location details: right long finger  Laceration length: 3 cm  Foreign bodies: no foreign bodies  Tendon involvement: none  Nerve involvement: none  Vascular damage: no  Anesthesia: local infiltration    Anesthesia:  Local Anesthetic: lidocaine 2% without epinephrine  Anesthetic total: 5 mL    Patient sedated: no  Preparation: Patient was prepped and draped in the usual sterile fashion.  Irrigation solution: saline  Irrigation method: syringe  Amount of cleaning: extensive  Debridement: none  Degree of undermining: none  Skin closure: 4-0 nylon  Number of sutures: 4  Technique: simple  Approximation: close  Approximation difficulty: simple  Dressing: antibiotic ointment, non-stick sterile dressing and gauze roll  Patient tolerance: Patient tolerated the procedure well with no immediate complications      "

## 2023-07-26 ENCOUNTER — OFFICE VISIT (OUTPATIENT)
Dept: URGENT CARE | Facility: CLINIC | Age: 28
End: 2023-07-26
Payer: MEDICAID

## 2023-07-26 VITALS
HEIGHT: 67 IN | WEIGHT: 135 LBS | OXYGEN SATURATION: 100 % | HEART RATE: 64 BPM | SYSTOLIC BLOOD PRESSURE: 118 MMHG | BODY MASS INDEX: 21.19 KG/M2 | TEMPERATURE: 98 F | DIASTOLIC BLOOD PRESSURE: 64 MMHG

## 2023-07-26 DIAGNOSIS — L02.419 AXILLARY ABSCESS: Primary | ICD-10-CM

## 2023-07-26 PROCEDURE — 99213 PR OFFICE/OUTPT VISIT, EST, LEVL III, 20-29 MIN: ICD-10-PCS | Mod: S$GLB,,, | Performed by: NURSE PRACTITIONER

## 2023-07-26 PROCEDURE — 99213 OFFICE O/P EST LOW 20 MIN: CPT | Mod: S$GLB,,, | Performed by: NURSE PRACTITIONER

## 2023-07-26 RX ORDER — MUPIROCIN 20 MG/G
OINTMENT TOPICAL 3 TIMES DAILY
Qty: 30 G | Refills: 0 | Status: SHIPPED | OUTPATIENT
Start: 2023-07-26 | End: 2023-12-15

## 2023-07-26 RX ORDER — SULFAMETHOXAZOLE AND TRIMETHOPRIM 800; 160 MG/1; MG/1
1 TABLET ORAL 2 TIMES DAILY
Qty: 20 TABLET | Refills: 0 | Status: SHIPPED | OUTPATIENT
Start: 2023-07-26 | End: 2023-08-05

## 2023-07-26 NOTE — PATIENT INSTRUCTIONS
You must understand that you've received an Urgent Care treatment only and that you may be released before all your medical problems are known or treated. You, the patient, will arrange for follow up care as instructed.  Follow up with your PCP or specialty clinic as directed in the next 1-2 weeks if not improved or as needed.  You can call [phone] to schedule an appointment with the appropriate provider.  If your condition worsens we recommend that you receive another evaluation at the emergency room immediately or contact your primary medical clinics after hours call service to discuss your concerns.  Please return here or go to the Emergency Department for any concerns or worsening of condition.  Please if you smoke please consider quitting. [facility] Smoke cessation hotline number is [phone], available at this number is free counseling and medications to live a healthier life!       If you were prescribed a narcotic or controlled medication, do not drive or operate heavy equipment or machinery while taking these medications.    If you were not prescribed an antibiotic and your not better please return for a recheck. Antibiotic therapy is not always indicated initially.   Please attempt over the counter medications, give it time and try Echinacea, Zinc and Vitamin C to fight common colds and virus.

## 2023-07-26 NOTE — PROGRESS NOTES
"Subjective:       Patient ID: Heide Dockery is a 28 y.o. female.    Vitals:  height is 5' 7" (1.702 m) and weight is 61.2 kg (135 lb). Her oral temperature is 98.4 °F (36.9 °C). Her blood pressure is 118/64 and her pulse is 64. Her oxygen saturation is 100%.     Chief Complaint: possible boil under arm (Saturday noticed what she believes is a staph infection under her right arm. )    This is a 28 y.o. female who presents today with a chief complaint of Saturday noticed what she believes is a staph infection under her right arm.   Patient presents with:  possible boil under arm: Saturday noticed what she believes is a staph infection under her right arm.          Skin:  Positive for erythema.         Objective:      Physical Exam   Constitutional: She is oriented to person, place, and time. She appears well-developed.   HENT:   Head: Normocephalic and atraumatic.   Ears:   Right Ear: External ear normal.   Left Ear: External ear normal.   Nose: Nose normal.   Mouth/Throat: Mucous membranes are normal.   Eyes: Conjunctivae and lids are normal.   Neck: Trachea normal. Neck supple.   Cardiovascular: Normal rate, regular rhythm and normal heart sounds.   Pulmonary/Chest: Effort normal and breath sounds normal. No respiratory distress.   Abdominal: Normal appearance and bowel sounds are normal. She exhibits no distension and no mass. Soft. There is no abdominal tenderness.   Musculoskeletal: Normal range of motion.         General: Normal range of motion.   Neurological: She is alert and oriented to person, place, and time. She has normal strength.   Skin: Skin is warm, dry, intact, not diaphoretic and not pale. erythema and lesion (small indurated papule, 1x1. not raised)   Psychiatric: Her speech is normal and behavior is normal. Judgment and thought content normal.   Nursing note and vitals reviewed.      Past medical history and current medications reviewed.     Not ready for I/d  But tender, pt diddnt want it " drained said its tender, told her if worsens come back and we can drain it if need.   Assessment:           1. Axillary abscess              Plan:         Axillary abscess  -     sulfamethoxazole-trimethoprim 800-160mg (BACTRIM DS) 800-160 mg Tab; Take 1 tablet by mouth 2 (two) times daily. for 10 days  Dispense: 20 tablet; Refill: 0  -     mupirocin (BACTROBAN) 2 % ointment; Apply topically 3 (three) times daily.  Dispense: 30 g; Refill: 0             Patient Instructions     You must understand that you've received an Urgent Care treatment only and that you may be released before all your medical problems are known or treated. You, the patient, will arrange for follow up care as instructed.  Follow up with your PCP or specialty clinic as directed in the next 1-2 weeks if not improved or as needed.  You can call [phone] to schedule an appointment with the appropriate provider.  If your condition worsens we recommend that you receive another evaluation at the emergency room immediately or contact your primary medical clinics after hours call service to discuss your concerns.  Please return here or go to the Emergency Department for any concerns or worsening of condition.  Please if you smoke please consider quitting. [facility] Smoke cessation hotline number is [phone], available at this number is free counseling and medications to live a healthier life!       If you were prescribed a narcotic or controlled medication, do not drive or operate heavy equipment or machinery while taking these medications.    If you were not prescribed an antibiotic and your not better please return for a recheck. Antibiotic therapy is not always indicated initially.   Please attempt over the counter medications, give it time and try Echinacea, Zinc and Vitamin C to fight common colds and virus.

## 2023-10-06 ENCOUNTER — OFFICE VISIT (OUTPATIENT)
Dept: URGENT CARE | Facility: CLINIC | Age: 28
End: 2023-10-06
Payer: MEDICAID

## 2023-10-06 VITALS
OXYGEN SATURATION: 98 % | TEMPERATURE: 98 F | SYSTOLIC BLOOD PRESSURE: 118 MMHG | RESPIRATION RATE: 19 BRPM | HEART RATE: 61 BPM | BODY MASS INDEX: 21.97 KG/M2 | HEIGHT: 67 IN | WEIGHT: 140 LBS | DIASTOLIC BLOOD PRESSURE: 82 MMHG

## 2023-10-06 DIAGNOSIS — J01.90 ACUTE BACTERIAL SINUSITIS: Primary | ICD-10-CM

## 2023-10-06 DIAGNOSIS — R09.81 NASAL CONGESTION: ICD-10-CM

## 2023-10-06 DIAGNOSIS — B96.89 ACUTE BACTERIAL SINUSITIS: Primary | ICD-10-CM

## 2023-10-06 LAB
CTP QC/QA: YES
SARS-COV-2 AG RESP QL IA.RAPID: NEGATIVE

## 2023-10-06 PROCEDURE — 87811 SARS CORONAVIRUS 2 ANTIGEN POCT, MANUAL READ: ICD-10-PCS | Mod: QW,S$GLB,, | Performed by: NURSE PRACTITIONER

## 2023-10-06 PROCEDURE — 99213 OFFICE O/P EST LOW 20 MIN: CPT | Mod: S$GLB,,, | Performed by: NURSE PRACTITIONER

## 2023-10-06 PROCEDURE — 99213 PR OFFICE/OUTPT VISIT, EST, LEVL III, 20-29 MIN: ICD-10-PCS | Mod: S$GLB,,, | Performed by: NURSE PRACTITIONER

## 2023-10-06 PROCEDURE — 87811 SARS-COV-2 COVID19 W/OPTIC: CPT | Mod: QW,S$GLB,, | Performed by: NURSE PRACTITIONER

## 2023-10-06 RX ORDER — AZITHROMYCIN 250 MG/1
TABLET, FILM COATED ORAL
Qty: 6 TABLET | Refills: 0 | Status: SHIPPED | OUTPATIENT
Start: 2023-10-06 | End: 2023-12-15

## 2023-10-06 RX ORDER — PREDNISONE 20 MG/1
TABLET ORAL
Qty: 7 TABLET | Refills: 0 | Status: SHIPPED | OUTPATIENT
Start: 2023-10-06 | End: 2023-10-11

## 2023-10-06 NOTE — PROGRESS NOTES
"Subjective:       Patient ID: Heide Dockery is a 28 y.o. female.    Vitals:  height is 5' 7" (1.702 m) and weight is 63.5 kg (140 lb). Her oral temperature is 98.1 °F (36.7 °C). Her blood pressure is 118/82 and her pulse is 61. Her respiration is 19 and oxygen saturation is 98%.     Chief Complaint: Sinus Problem (X 2 days with sinus pain and congestion,  post nasal drip and a headache.)    This is a 28 y.o. female who presents today with a chief complaint of X 2 days with sinus pain and congestion,  post nasal drip and a headache.    Patient presents with:  Sinus Problem: X 2 days with sinus pain and congestion,  post nasal drip and a headache.         Sinus Problem  This is a new problem. The current episode started in the past 7 days. The problem is unchanged. There has been no fever. Her pain is at a severity of 0/10. She is experiencing no pain. Associated symptoms include congestion, coughing, headaches, sinus pressure and a sore throat. Pertinent negatives include no shortness of breath. Past treatments include nothing. The treatment provided no relief.       Constitution: Negative.   HENT:  Positive for congestion, sinus pain, sinus pressure and sore throat.    Respiratory:  Positive for cough. Negative for shortness of breath and wheezing.    Musculoskeletal: Negative.    Neurological:  Positive for headaches.           Objective:      Physical Exam   Constitutional: She is oriented to person, place, and time. She appears well-developed. She is cooperative.  Non-toxic appearance. She does not appear ill. No distress.   HENT:   Head: Normocephalic and atraumatic.   Ears:   Right Ear: Hearing, tympanic membrane, external ear and ear canal normal.   Left Ear: Hearing, tympanic membrane, external ear and ear canal normal.   Nose: No mucosal edema, rhinorrhea or nasal deformity. No epistaxis. Right sinus exhibits maxillary sinus tenderness. Right sinus exhibits no frontal sinus tenderness. Left sinus exhibits " maxillary sinus tenderness. Left sinus exhibits no frontal sinus tenderness.   Mouth/Throat: Uvula is midline, oropharynx is clear and moist and mucous membranes are normal. No trismus in the jaw. Normal dentition. No uvula swelling. No oropharyngeal exudate, posterior oropharyngeal edema or posterior oropharyngeal erythema.   Eyes: Conjunctivae and lids are normal. No scleral icterus.   Neck: Trachea normal and phonation normal. Neck supple. No edema present. No erythema present. No neck rigidity present.   Cardiovascular: Normal rate, regular rhythm, normal heart sounds and normal pulses.   Pulmonary/Chest: Effort normal and breath sounds normal. No respiratory distress. She has no decreased breath sounds. She has no rhonchi.   Abdominal: Normal appearance.   Musculoskeletal: Normal range of motion.         General: No deformity. Normal range of motion.   Neurological: She is alert and oriented to person, place, and time. She exhibits normal muscle tone. Coordination normal.   Skin: Skin is warm, dry, intact, not diaphoretic and not pale.   Psychiatric: Her speech is normal and behavior is normal. Judgment and thought content normal.   Nursing note and vitals reviewed.        Past medical history and current medications reviewed.     Results for orders placed or performed in visit on 10/06/23   SARS Coronavirus 2 Antigen, POCT Manual Read   Result Value Ref Range    SARS Coronavirus 2 Antigen Negative Negative     Acceptable Yes       Assessment:           1. Acute bacterial sinusitis    2. Nasal congestion              Plan:         Acute bacterial sinusitis  -     azithromycin (Z-ARON) 250 MG tablet; Take 2 tablets by mouth on day 1; Then Take 1 tablet by mouth on days 2-5  Dispense: 6 tablet; Refill: 0    Nasal congestion  -     SARS Coronavirus 2 Antigen, POCT Manual Read  -     predniSONE (DELTASONE) 20 MG tablet; Take 2 tablets (40 mg total) by mouth once daily for 2 days, THEN 1 tablet (20 mg  total) once daily for 3 days.  Dispense: 7 tablet; Refill: 0             Patient Instructions   Please return here or go to the Emergency Department for any concerns or worsening of condition.  Please drink plenty of fluids.  Please get plenty of rest.  If you were prescribed antibiotics, please take them to completion.  If you were given wait & see antibiotics, please wait 5-7 days before taking them, and only take them if your symptoms have worsened or not improved.  If you do begin taking the antibiotics, please take them to completion.  If you were given a steroid shot in the clinic and have also been given a prescription for a steroid such as Prednisone or a Medrol Dose Pack, please begin taking them tomorrow.  If you do not have Hypertension or any history of palpitations, it is ok to take over the counter Sudafed or Mucinex D or Allegra-D or Claritin-D or Zyrtec-D.  If you do take one of the above, it is ok to combine that with plain over the counter Mucinex or Allegra or Claritin or Zyrtec.  If for example you are taking Zyrtec -D, you can combine that with Mucinex, but not Mucinex-D.  If you are taking Mucinex-D, you can combine that with plain Allegra or Claritin or Zyrtec.   If you do have Hypertension or palpitations, it is safe to take Coricidin HBP for relief of sinus symptoms.  If not allergic, please take over the counter Tylenol (Acetaminophen) and/or Motrin (Ibuprofen) as directed for control of pain and/or fever.  Please follow up with your primary care doctor or specialist as needed.    If you  smoke, please stop smoking.         Dayday Dockery, ANTONIOC

## 2023-11-29 ENCOUNTER — OFFICE VISIT (OUTPATIENT)
Dept: URGENT CARE | Facility: CLINIC | Age: 28
End: 2023-11-29
Payer: MEDICAID

## 2023-11-29 VITALS
OXYGEN SATURATION: 98 % | WEIGHT: 140 LBS | TEMPERATURE: 98 F | HEART RATE: 71 BPM | RESPIRATION RATE: 18 BRPM | BODY MASS INDEX: 22.5 KG/M2 | SYSTOLIC BLOOD PRESSURE: 123 MMHG | DIASTOLIC BLOOD PRESSURE: 82 MMHG | HEIGHT: 66 IN

## 2023-11-29 DIAGNOSIS — B96.89 BACTERIAL PHARYNGITIS: Primary | ICD-10-CM

## 2023-11-29 DIAGNOSIS — J02.8 BACTERIAL PHARYNGITIS: Primary | ICD-10-CM

## 2023-11-29 DIAGNOSIS — Z20.828 EXPOSURE TO THE FLU: ICD-10-CM

## 2023-11-29 PROCEDURE — 99213 PR OFFICE/OUTPT VISIT, EST, LEVL III, 20-29 MIN: ICD-10-PCS | Mod: S$GLB,,, | Performed by: NURSE PRACTITIONER

## 2023-11-29 PROCEDURE — 99213 OFFICE O/P EST LOW 20 MIN: CPT | Mod: S$GLB,,, | Performed by: NURSE PRACTITIONER

## 2023-11-29 RX ORDER — OSELTAMIVIR PHOSPHATE 75 MG/1
75 CAPSULE ORAL 2 TIMES DAILY
Qty: 10 CAPSULE | Refills: 0 | Status: SHIPPED | OUTPATIENT
Start: 2023-11-29 | End: 2023-12-04

## 2023-11-29 RX ORDER — PROMETHAZINE HYDROCHLORIDE AND DEXTROMETHORPHAN HYDROBROMIDE 6.25; 15 MG/5ML; MG/5ML
5 SYRUP ORAL EVERY 4 HOURS PRN
Qty: 120 ML | Refills: 0 | Status: SHIPPED | OUTPATIENT
Start: 2023-11-29 | End: 2023-12-09

## 2023-11-29 RX ORDER — CEFDINIR 300 MG/1
300 CAPSULE ORAL 2 TIMES DAILY
Qty: 20 CAPSULE | Refills: 0 | Status: SHIPPED | OUTPATIENT
Start: 2023-11-29 | End: 2023-12-09

## 2023-11-29 NOTE — LETTER
November 29, 2023      [facility]  [facility] [address], [address]  [place] [address]  Phone: [phone]  Fax: [phone]       Patient: Heide Dockery   YOB: 1995  Date of Visit: 11/29/2023    To Whom It May Concern:    Elvin Dockery  was at [facility] on 11/29/2023. The patient may return to work/school on 11/04/23 with no restrictions. If you have any questions or concerns, or if I can be of further assistance, please do not hesitate to contact me.    Sincerely,    JERO Hollingsworth

## 2023-11-29 NOTE — PROGRESS NOTES
"Subjective:       Patient ID: Heide Dockery is a 28 y.o. female.    Vitals:  height is 5' 6" (1.676 m) and weight is 63.5 kg (140 lb). Her oral temperature is 98.2 °F (36.8 °C). Her blood pressure is 123/82 and her pulse is 71. Her respiration is 18 and oxygen saturation is 98%.     Chief Complaint: Sore Throat (Sore throat and sinus congestion x 3 days. )    This is a 28 y.o. female who presents today with a chief complaint of  Patient presents with:  Sore Throat: Sore throat and sinus congestion x 3 days. Patient declined all testing.         Sore Throat   This is a new problem. The current episode started in the past 7 days. The problem has been unchanged. There has been no fever. Associated symptoms include congestion and headaches. She has tried acetaminophen for the symptoms. The treatment provided no relief.       HENT:  Positive for congestion and sore throat.    Neurological:  Positive for headaches.           Objective:      Physical Exam   Constitutional: She is oriented to person, place, and time. She appears well-developed.   HENT:   Head: Normocephalic and atraumatic.   Ears:   Right Ear: External ear normal.   Left Ear: External ear normal.   Nose: Rhinorrhea and congestion present.   Mouth/Throat: Mucous membranes are normal.   Eyes: Conjunctivae and lids are normal.   Neck: Trachea normal. Neck supple.   Cardiovascular: Normal rate, regular rhythm and normal heart sounds.   Pulmonary/Chest: Effort normal and breath sounds normal. No respiratory distress.   Abdominal: Normal appearance and bowel sounds are normal. She exhibits no distension and no mass. Soft. There is no abdominal tenderness.   Musculoskeletal: Normal range of motion.         General: Normal range of motion.   Neurological: She is alert and oriented to person, place, and time. She has normal strength.   Skin: Skin is warm, dry, intact, not diaphoretic and not pale.   Psychiatric: Her speech is normal and behavior is normal. " Judgment and thought content normal.   Nursing note and vitals reviewed.        Past medical history and current medications reviewed.       Assessment:           1. Bacterial pharyngitis    2. Exposure to the flu              Plan:         Bacterial pharyngitis  -     cefdinir (OMNICEF) 300 MG capsule; Take 1 capsule (300 mg total) by mouth 2 (two) times daily. for 10 days  Dispense: 20 capsule; Refill: 0    Exposure to the flu  -     oseltamivir (TAMIFLU) 75 MG capsule; Take 1 capsule (75 mg total) by mouth 2 (two) times daily. for 5 days  Dispense: 10 capsule; Refill: 0  -     promethazine-dextromethorphan (PROMETHAZINE-DM) 6.25-15 mg/5 mL Syrp; Take 5 mLs by mouth every 4 (four) hours as needed.  Dispense: 120 mL; Refill: 0             There are no Patient Instructions on file for this visit.

## 2024-01-01 ENCOUNTER — HOSPITAL ENCOUNTER (EMERGENCY)
Facility: HOSPITAL | Age: 29
Discharge: LEFT BEFORE DISCHARGE | End: 2024-01-01
Payer: MEDICAID

## 2024-01-01 VITALS
HEART RATE: 103 BPM | HEIGHT: 66 IN | TEMPERATURE: 99 F | BODY MASS INDEX: 22.5 KG/M2 | WEIGHT: 140 LBS | OXYGEN SATURATION: 97 % | DIASTOLIC BLOOD PRESSURE: 75 MMHG | RESPIRATION RATE: 20 BRPM | SYSTOLIC BLOOD PRESSURE: 114 MMHG

## 2024-01-01 DIAGNOSIS — J11.1 INFLUENZA-LIKE ILLNESS: Primary | ICD-10-CM

## 2024-01-01 LAB
GROUP A STREP, MOLECULAR: NEGATIVE
INFLUENZA A, MOLECULAR: POSITIVE
INFLUENZA B, MOLECULAR: NEGATIVE
SARS-COV-2 RDRP RESP QL NAA+PROBE: NEGATIVE
SPECIMEN SOURCE: ABNORMAL

## 2024-01-01 PROCEDURE — 87651 STREP A DNA AMP PROBE: CPT | Performed by: NURSE PRACTITIONER

## 2024-01-01 PROCEDURE — 87502 INFLUENZA DNA AMP PROBE: CPT | Performed by: NURSE PRACTITIONER

## 2024-01-01 PROCEDURE — 99282 EMERGENCY DEPT VISIT SF MDM: CPT

## 2024-01-01 PROCEDURE — U0002 COVID-19 LAB TEST NON-CDC: HCPCS | Performed by: NURSE PRACTITIONER

## 2024-01-02 ENCOUNTER — OFFICE VISIT (OUTPATIENT)
Dept: URGENT CARE | Facility: CLINIC | Age: 29
End: 2024-01-02
Payer: MEDICAID

## 2024-01-02 VITALS
DIASTOLIC BLOOD PRESSURE: 80 MMHG | HEART RATE: 76 BPM | WEIGHT: 140 LBS | BODY MASS INDEX: 22.5 KG/M2 | HEIGHT: 66 IN | SYSTOLIC BLOOD PRESSURE: 122 MMHG | OXYGEN SATURATION: 98 % | TEMPERATURE: 98 F | RESPIRATION RATE: 18 BRPM

## 2024-01-02 DIAGNOSIS — R05.9 COUGH, UNSPECIFIED TYPE: ICD-10-CM

## 2024-01-02 DIAGNOSIS — J11.1 INFLUENZA: ICD-10-CM

## 2024-01-02 DIAGNOSIS — R50.9 FEVER, UNSPECIFIED FEVER CAUSE: Primary | ICD-10-CM

## 2024-01-02 LAB
CTP QC/QA: YES
POC MOLECULAR INFLUENZA A AGN: POSITIVE
POC MOLECULAR INFLUENZA B AGN: NEGATIVE

## 2024-01-02 PROCEDURE — 87502 INFLUENZA DNA AMP PROBE: CPT | Mod: QW,,, | Performed by: NURSE PRACTITIONER

## 2024-01-02 PROCEDURE — 99214 OFFICE O/P EST MOD 30 MIN: CPT | Mod: S$GLB,,, | Performed by: NURSE PRACTITIONER

## 2024-01-02 RX ORDER — ONDANSETRON 4 MG/1
4 TABLET, ORALLY DISINTEGRATING ORAL EVERY 6 HOURS PRN
Qty: 12 TABLET | Refills: 0 | Status: SHIPPED | OUTPATIENT
Start: 2024-01-02 | End: 2024-01-05

## 2024-01-02 RX ORDER — OSELTAMIVIR PHOSPHATE 75 MG/1
75 CAPSULE ORAL EVERY 12 HOURS
Qty: 10 CAPSULE | Refills: 0 | Status: SHIPPED | OUTPATIENT
Start: 2024-01-02 | End: 2024-01-07

## 2024-01-02 RX ORDER — GUAIFENESIN/DEXTROMETHORPHAN 100-10MG/5
5 SYRUP ORAL EVERY 6 HOURS PRN
Qty: 120 ML | Refills: 0 | Status: SHIPPED | OUTPATIENT
Start: 2024-01-02 | End: 2024-01-12

## 2024-01-02 NOTE — PROGRESS NOTES
"Subjective:      Patient ID: Heide Dockery is a 28 y.o. female.    Vitals:  height is 5' 6" (1.676 m) and weight is 63.5 kg (140 lb). Her oral temperature is 98.3 °F (36.8 °C). Her blood pressure is 122/80 and her pulse is 76. Her respiration is 18 and oxygen saturation is 98%.     Chief Complaint: Fever    This is a 28 y.o. female who presents today with a chief complaint of  the flu symptoms. Pt advised she has headache, nausea, vomiting, body aches and chills which started yesterday morning. Patient stated runny nose started today. Patient tested positive for Flu A last night at the ED. However, she left prior to being seen.    Fever   This is a new problem. The current episode started yesterday. The problem has been gradually worsening. The maximum temperature noted was 100 to 100.9 F. Associated symptoms include congestion, coughing, headaches, muscle aches and sleepiness. The treatment provided mild relief.       Constitution: Positive for fever.   HENT:  Positive for congestion.    Respiratory:  Positive for cough.    Neurological:  Positive for headaches.      Objective:     Physical Exam   Constitutional: She is oriented to person, place, and time. normal  HENT:   Head: Normocephalic and atraumatic.   Ears:   Right Ear: Tympanic membrane, external ear and ear canal normal.   Left Ear: Tympanic membrane, external ear and ear canal normal.   Nose: Nose normal.   Mouth/Throat: Mucous membranes are moist. Oropharynx is clear.   Eyes: Conjunctivae are normal. Extraocular movement intact   Neck: Neck supple.   Cardiovascular: Normal rate, regular rhythm, normal heart sounds and normal pulses.   Pulmonary/Chest: Effort normal and breath sounds normal.   Abdominal: Normal appearance.   Musculoskeletal: Normal range of motion.         General: Normal range of motion.   Neurological: She is alert and oriented to person, place, and time.   Skin: Skin is warm and dry.   Psychiatric: Her behavior is normal.   Vitals " reviewed.    Results for orders placed or performed in visit on 01/02/24   POCT Influenza A/B MOLECULAR   Result Value Ref Range    POC Molecular Influenza A Ag Positive (A) Negative, Not Reported    POC Molecular Influenza B Ag Negative Negative, Not Reported     Acceptable Yes     No results found.     Assessment:     1. Fever, unspecified fever cause    2. Influenza    3. Cough, unspecified type        Plan:       Fever, unspecified fever cause  -     POCT Influenza A/B MOLECULAR    Influenza  -     oseltamivir (TAMIFLU) 75 MG capsule; Take 1 capsule (75 mg total) by mouth every 12 (twelve) hours. for 5 days  Dispense: 10 capsule; Refill: 0  -     ondansetron (ZOFRAN-ODT) 4 MG TbDL; Take 1 tablet (4 mg total) by mouth every 6 (six) hours as needed (Nausea).  Dispense: 12 tablet; Refill: 0    Cough, unspecified type  -     dextromethorphan-guaiFENesin  mg/5 ml (ROBITUSSIN-DM)  mg/5 mL liquid; Take 5 mLs by mouth every 6 (six) hours as needed (Cough).  Dispense: 120 mL; Refill: 0    INSTRUCTIONS  Meds as prescribed. Follow up as advised.

## 2024-01-02 NOTE — ED PROVIDER NOTES
Encounter Date: 1/1/2024       History     Chief Complaint   Patient presents with    Headache    Nausea    Chills    Cough    Vomiting     Cough, chills, headache, bodyaches and nausea/vomiting that Started today      Started today with fever, headache chills, body.  Has taken over-the-counter medication no improvement in symptoms.Has taken Ibuprofen with no improvement in symptoms. Pain is rated at 8/10.       Review of patient's allergies indicates:   Allergen Reactions    Penicillins      Other reaction(s): unknown     Past Medical History:   Diagnosis Date    Mental disorder      Past Surgical History:   Procedure Laterality Date    abdominal lap      ESOPHAGOGASTRODUODENOSCOPY N/A 9/24/2019    Procedure: ESOPHAGOGASTRODUODENOSCOPY (EGD);  Surgeon: Dmitry Segura MD;  Location: [facility] ENDO;  Service: General;  Laterality: N/A;    HYSTERECTOMY      SALPINGOOPHORECTOMY Right 12/21/2020    Procedure: SALPINGO-OOPHORECTOMY, LAPAROSCOPIC Possible open;  Surgeon: Katelyn Delacruz MD;  Location: [facility] OR;  Service: OB/GYN;  Laterality: Right;    TUBAL LIGATION      WISDOM TOOTH EXTRACTION       Family History   Problem Relation Age of Onset    No Known Problems Mother     No Known Problems Sister     No Known Problems Brother     Breast cancer Maternal Grandmother      Social History     Tobacco Use    Smoking status: Every Day     Types: Vaping with nicotine     Passive exposure: Never    Smokeless tobacco: Current    Tobacco comments:     VAPING, BUT NOT SMOKING CIGARETTES   Substance Use Topics    Alcohol use: Yes     Comment: occ    Drug use: Never     Review of Systems   Constitutional:  Positive for chills, fatigue and fever.   HENT:  Positive for congestion. Negative for sore throat.    Respiratory:  Positive for cough. Negative for wheezing.    Cardiovascular:  Negative for chest pain and palpitations.   Gastrointestinal:  Negative for abdominal pain, diarrhea, nausea and vomiting.   Musculoskeletal:   Positive for myalgias.   Neurological:  Positive for headaches.   All other systems reviewed and are negative.      Physical Exam     Initial Vitals [01/01/24 1904]   BP Pulse Resp Temp SpO2   114/75 103 20 98.5 °F (36.9 °C) 97 %      MAP       --         Physical Exam    Nursing note and vitals reviewed.  Constitutional: She appears well-developed and well-nourished.   HENT:   Head: Normocephalic and atraumatic.   Eyes: EOM are normal.   Neck: Neck supple.   Cardiovascular:  Normal rate and regular rhythm.           Pulmonary/Chest: Breath sounds normal. She has no wheezes.   Musculoskeletal:      Cervical back: Neck supple.     Neurological: She is alert and oriented to person, place, and time.   Skin: Skin is warm and dry. Capillary refill takes less than 2 seconds.   Psychiatric: She has a normal mood and affect.         ED Course   Procedures  Labs Reviewed   INFLUENZA A & B BY MOLECULAR   GROUP A STREP, MOLECULAR   SARS-COV-2 RNA AMPLIFICATION, QUAL          Imaging Results    None          Medications - No data to display  Medical Decision Making  28 year old female with influenza like symptoms      Differential diagnoses: Covid, Influenza, Viral illness    Amount and/or Complexity of Data Reviewed  Labs: ordered. Decision-making details documented in ED Course.    Risk  Risk Details: Pt eloped after being seen and before labs resulted                                      Clinical Impression:  Final diagnoses:  [J11.1] Influenza-like illness (Primary)          ED Disposition Condition    Eloped                 Lidya Angela, [facility]  01/01/24 1927

## 2024-01-02 NOTE — LETTER
January 2, 2024      [facility]  [facility] [address] [facility], [address]  [place] [address]  Phone: [phone]  Fax: [phone]       Patient: Heide Dockery   YOB: 1995  Date of Visit: 01/02/2024    To Whom It May Concern:    Elvin Dockery  was at [facility] on 01/02/2024. The patient may return to work/school on 01/08/2024 with no restrictions. If you have any questions or concerns, or if I can be of further assistance, please do not hesitate to contact me.    Sincerely,    RT Zia(R)

## 2024-08-13 DIAGNOSIS — M75.22 BICEPS TENDINITIS OF BOTH SHOULDERS: ICD-10-CM

## 2024-08-13 DIAGNOSIS — M75.82 TENDINITIS OF BOTH ROTATOR CUFFS: Primary | ICD-10-CM

## 2024-08-13 DIAGNOSIS — M75.52 SUBACROMIAL BURSITIS OF BOTH SHOULDERS: ICD-10-CM

## 2024-08-13 DIAGNOSIS — M75.51 SUBACROMIAL BURSITIS OF BOTH SHOULDERS: ICD-10-CM

## 2024-08-13 DIAGNOSIS — M75.21 BICEPS TENDINITIS OF BOTH SHOULDERS: ICD-10-CM

## 2024-08-13 DIAGNOSIS — M75.81 TENDINITIS OF BOTH ROTATOR CUFFS: Primary | ICD-10-CM

## 2024-08-13 NOTE — PROGRESS NOTES
[facility]  Occupational Therapy Initial Evaluation      Name: Heide Dockery  Clinic Number: [id number]    Therapy Diagnosis: No diagnosis found.  Physician: Hermann Pradhan DO    Physician Orders: Eval and Treat  Medical Diagnosis:   M75.81,M75.82 (ICD-10-CM) - Tendinitis of both rotator cuffs   M75.21,M75.22 (ICD-10-CM) - Biceps tendinitis of both shoulders   M75.51,M75.52 (ICD-10-CM) - Subacromial bursitis of both shoulders     Surgical Procedure and Date: right shoulder arthroscopy with RTC, DCE, and biceps tenodesis  Evaluation Date: 8/14/2024  Insurance Authorization Period Expiration: 8/13/25  Plan of Care Certification Period: ***  Date of Return to MD: ***  Visit # / Visits authorized: 1 / 1  FOTO: 1/ 3    Precautions:  {IP WOUND PRECAUTIONS OHS:82117}    Time In: ***  Time Out: ***  Total Billable Time: *** minutes    Subjective      Date of Onset: ***    History of Current Condition/Mechanism of Injury: Heide reports: patient experienced left shoulder pain which has worsened over the past several months. Patient reported her pain is worse with overhead activities, and her pain reduces with avoidance of overhead activities. Patient reported her pain is worse at night. She is a *** sleeper. Patient denied any numbness or tingling. Patient also reported a history of right shoulder pain. She received a right shoulder arthroscopy and rotator cuff repair with distal clavicle excision with a biceps tenodesis approximately 2 years ago. Patient complained of an enlarged right biceps muscle following tenodesis.    Falls: ***    Involved Side: bilateral  Dominant Side: { hand dominance:[id number]}    Mechanism of Injury: ***  Surgical Procedure: right shoulder arthroscopy with RTC, DCE, and biceps tenodesis  Imaging:  Procedure: XR SHOULDER LEFT MIN 2 VIEWS    Result Date: 8/9/2024  Narrative: Multiple views of the left shoulder were obtained. No marked glenohumeral joint  degenerative changes noted. AC joint degenerative narrowing is noted. No acute fractures, subluxations, or dislocations are noted. Visible lung fields are grossly unremarkable. Soft tissues are grossly unremarkable.    Procedure: XR SHOULDER RIGHT MIN 2 VIEWS    Result Date: 8/9/2024  Narrative: Multiple views of the right shoulder were obtained. These demonstrate signs which may be consistent of a previous distal clavicle excision as well as previous anchor placement within the proximal humerus. No marked degenerative joint changes noted. No acute fractures, subluxations, or dislocations are noted. Soft tissues are grossly unremarkable. Visible lung fields are grossly unremarkable.      Prior Therapy: following right RTC repair and for chronic right shoulder pain    Pain:  Functional Pain Scale Rating 0-10:   {NUMBERS; 0-10:5044}/10 on average  {NUMBERS; 0-10:5044}/10 at best  {NUMBERS; 0-10:5044}/10 at worst  Location: ***  Description: {Pain Description:68058}  Aggravating Factors: {Causes; Pain:00547}  Easing Factors: {Pain (activities that relieve):42915}    Occupation:  ***  Working presently: {Work history:56647}  Duties: ***    Functional Limitations/Social History:    Previous functional status includes: Independent with all ADLs. ***    Current Functional Status   Home/Living environment: {LIVES WITH:76643}    - *** story home, *** steps to enter    - DME: ***      Limitation of Functional Status as follows:   ADLs/IADLs:     - Feeding: ***    - Bathing: ***    - Dressing/Grooming: ***    - Home Management: ***    - Driving: ***     Leisure: {AMB OT HAND LEISURE:36595}    Patient's Goals for Therapy: ***    Past Medical History/Physical Systems Review:   Heide Dockery  has a past medical history of Mental disorder.    Heide Dockery  has a past surgical history that includes abdominal lap; Tubal ligation; Hysterectomy; Esophagogastroduodenoscopy (N/A, 9/24/2019); [place] tooth extraction; and  Salpingoophorectomy (Right, 12/21/2020).    Heide has a current medication list which includes the following prescription(s): ergocalciferol and escitalopram oxalate.    Review of patient's allergies indicates:   Allergen Reactions    Penicillins      Other reaction(s): unknown        Objective      Sensation: {AMB OT SHOULDER SENSATION TEST:57227:n}    Observation/Inspection   Left Right   Anatomic Symmetry {AMB OT SHOULDER OBSERVATION:43711:n} {AMB OT SHOULDER OBSERVATION:64741:n}   Bony {AMB OT SHOULDER OBSERVATION:58350:n} {AMB OT SHOULDER OBSERVATION:52385:n}   Suparspinatus {AMB OT SHOULDER OBSERVATION:99668:n} {AMB OT SHOULDER OBSERVATION:22588:n}   Infraspinatus {AMB OT SHOULDER OBSERVATION:97406:n} {AMB OT SHOULDER OBSERVATION:85142:n}   Rhomboid {AMB OT SHOULDER OBSERVATION:17309:n} {AMB OT SHOULDER OBSERVATION:44411:n}     Observation/Inspection:  {AMB OT SHOULDER OBSERVATION/INSPECTION:82549:n}      Range of Motion:    (L) UE (R) UE       Norm   Shoulder Flexion   0-180   Shoulder Flexion *** *** 0-180   Shoulder Abduction *** *** 0-180   Shoulder Extension *** *** 0-50   Fxl IR *** ***    SLIR *** *** 0-70   ER@0*abd *** *** 0-90   ER@45*abd *** *** 0-90   ER@90*abd *** *** 0-90   Horz Shoulder Adduction *** *** 0-40     ROM Comments:   {AMB OT SHOULDER ROM COMMENTS:50113:n}    Painful Arc:   Patient {AMB OT SHOULDER PAINFUL ARC:80870:n}    ***Inspection/strength: Tenderness palpation in the lateral and posterior subacromial space. Tenderness palpation about the AC joint. Mild tenderness palpation along the biceps tendon. 4/5 strength with abduction, 4/5 strength with flexion. 5/5 strength with external/internal rotation.  Stability: The left shoulder stable to a passive range of motion. Provocative testing is negative for instability  Special tests: Positive Musa test, positive impingement sign, positive Speed test, equivocal empty can test for pain.     Strength  Shoulder Flexion RUE: {AMB OT  SHOULDER STRENGTH:27157:n}   Shoulder Extension RUE: {AMB OT SHOULDER STRENGTH:92012:n}   Shoulder Abduction RUE:  {AMB OT SHOULDER STRENGTH:45193:n}   Shoulder Adduction RUE:  {AMB OT SHOULDER STRENGTH:36956:n}   Internal Rotation RUE: {AMB OT SHOULDER STRENGTH:21037:n}   External Rotation RUE: {AMB OT SHOULDER STRENGTH:49971:n}   Horizontal Adduction RUE: {AMB OT SHOULDER STRENGTH:41488:n}   Shoulder Flexion LUE: {AMB OT SHOULDER STRENGTH:37506:n}   Shoulder Extension LUE: {AMB OT SHOULDER STRENGTH:66841:n}   Shoulder Abduction LUE: {AMB OT SHOULDER STRENGTH:87360:n}   Shoulder Abbduction LUE: {AMB OT SHOULDER STRENGTH:01901:n}   Internal Rotation LUE:  {AMB OT SHOULDER STRENGTH:45405:n}   External Rotation LUE:  {AMB OT SHOULDER STRENGTH:49636:n}   Horizontal Adduction LUE:  {AMB OT SHOULDER STRENGTH:57493:n}       Special Tests:  Positive: {OT SHOULDER SPECIAL TESTS:62445}  Negative: {OT SHOULDER SPECIAL TESTS:69814}      Palpation: (for pain)     Positive: {OT SHOULDER PALPATION:70664}     Negative: {OT SHOULDER PALPATION:55042}     Strength: (BERNARDINO Dynamometer, setting II, avg of 3 trials)  R=***; L=***  [norms for women aged 30-34: R=78.7 +/-19.2; L=68.0 +/-17.7 (Roge et al, 1985)]    Intake Outcome Measure for FOTO Shoulder Survey    Therapist reviewed FOTO scores for Heide Dockery on 8/14/2024.   FOTO report - see Media section or FOTO account episode details.    Intake Score: ***%       Treatment      Total Treatment time (time-based codes) separate from Evaluation: *** minutes    Heide received the treatments listed below:      therapeutic exercises to develop {AMB PT PROGRESS OBJECTIVE:72121} for *** minutes including:  ***    manual therapy techniques: {AMB PT PROGRESS MANUAL THERAPY:25455} were applied to the: *** for *** minutes, including:  ***    Patient Education and Home Exercises      Education provided:   -role of OT, goals for OT, scheduling/cancellations, insurance limitations  with patient.  -Additional Education provided: ***    Written Home Exercises Provided: yes.  Exercises were reviewed and Heide was able to demonstrate them prior to the end of the session.    Heide demonstrated good  understanding of the education provided.     Pt was advised to perform these exercises free of pain, and to stop performing them if pain occurs.    See EMR under Patient Instructions for exercises provided 8/14/2024.    Assessment      Heide Dockery is a 29 y.o. female referred to outpatient occupational therapy and presents with a medical diagnosis of tendinitis of both rotator cuffs, biceps tendinitis of both shoulders, subacromial bursitis of both shoulders.***    Following medical record review it is determined that pt will benefit from occupational therapy services in order to maximize pain free and/or functional use of bilateral shoulders. The following goals were discussed with the patient and patient is in agreement with them as to be addressed in the treatment plan. The patient's rehab potential is {REHAB PROGNOSIS OHS:10384}.     Anticipated barriers to occupational therapy: ***    Plan of care discussed with patient: Yes  Patient's spiritual, cultural and educational needs considered and patient is agreeable to the plan of care and goals as stated below:     Medical Necessity is demonstrated by the following  Occupational Profile/History  Co-morbidities and personal factors that may impact the plan of care [x] LOW: Brief chart review  [] MODERATE: Expanded chart review   [] HIGH: Extensive chart review    Moderate / High Support Documentation: N/A     Examination  Performance deficits relating to physical, cognitive or psychosocial skills that result in activity limitations and/or participation restrictions  [] LOW: addressing 1-3 Performance deficits  [] MODERATE: 3-5 Performance deficits  [] HIGH: 5+ Performance deficits (please support below)    Moderate / High Support  Documentation:    Physical:  {Physical:33206}    Cognitive:  {Cognitive:23132}    Psychosocial:    {Psychosocial:35070}     Treatment Options [] LOW: Limited options  [] MODERATE: Several options  [] HIGH: Multiple options      Decision Making/ Complexity Score: {Desc; low/moderate/high:052306}       The following goals were discussed with the patient and patient is in agreement with them as to be addressed in the treatment plan.     Goals:   Short-term  Patient will demonstrate independence in home exercise program by 6/3/24.   Patient will report independence in bathing tasks using right upper extremity by 6/10/24.  Patient will report minimal to no challenges driving using her right upper extremity as medically cleared by MD for task by 6/10/24.   Patient will report moderate difficulty performing light household IADL tasks using right upper extremity by 6/10/24.    Long-term  Patient will demonstrate improved functional use of her right upper extremity in ADL/IADL tasks as evidenced by increased FOTO score of 15% by 7/8/24.   Patient will report independence in upper body dressing using her right upper extremity by 7/8/24.   Patient will report minimum to no difficulty performing light household IADL tasks using right upper extremity by 7/8/24.   Patient will achieve relatively equal range of motion in bilateral shoulders in order for increased independence in ADL/IADL tasks by 7/8/24.     Plan     Plan of Care Certification: 8/14/2024 to ***.     Outpatient Occupational Therapy 2 times weekly for 6 weeks to include the following interventions: Manual therapy/joint mobilizations, Modalities for pain management, Therapeutic exercises/activities., Strengthening, Electrical Modalities, and Joint Protection.    [facility] OT    Physician's Signature: _________________________________________ Date: ________________

## 2024-08-14 ENCOUNTER — CLINICAL SUPPORT (OUTPATIENT)
Dept: REHABILITATION | Facility: HOSPITAL | Age: 29
End: 2024-08-14
Payer: COMMERCIAL

## 2024-08-14 DIAGNOSIS — Z78.9 IMPAIRED ACTIVITIES OF DAILY LIVING: Primary | ICD-10-CM

## 2024-08-14 DIAGNOSIS — M25.512 BILATERAL SHOULDER PAIN, UNSPECIFIED CHRONICITY: ICD-10-CM

## 2024-08-14 DIAGNOSIS — M75.81 TENDINITIS OF BOTH ROTATOR CUFFS: ICD-10-CM

## 2024-08-14 DIAGNOSIS — M25.511 BILATERAL SHOULDER PAIN, UNSPECIFIED CHRONICITY: ICD-10-CM

## 2024-08-14 DIAGNOSIS — M75.22 BICEPS TENDINITIS OF BOTH SHOULDERS: ICD-10-CM

## 2024-08-14 DIAGNOSIS — M75.52 SUBACROMIAL BURSITIS OF BOTH SHOULDERS: ICD-10-CM

## 2024-08-14 DIAGNOSIS — M75.82 TENDINITIS OF BOTH ROTATOR CUFFS: ICD-10-CM

## 2024-08-14 DIAGNOSIS — M75.51 SUBACROMIAL BURSITIS OF BOTH SHOULDERS: ICD-10-CM

## 2024-08-14 DIAGNOSIS — M75.21 BICEPS TENDINITIS OF BOTH SHOULDERS: ICD-10-CM

## 2024-08-14 PROCEDURE — 97110 THERAPEUTIC EXERCISES: CPT | Mod: PN

## 2024-08-14 PROCEDURE — 97166 OT EVAL MOD COMPLEX 45 MIN: CPT | Mod: PN

## 2024-08-14 NOTE — PLAN OF CARE
[facility]  Occupational Therapy Initial Evaluation      Name: Heide Dockery  Clinic Number: [id number]    Therapy Diagnosis: impaired activities of daily living ICD-10-CM Z78.9; bilateral shoulder pain, unspecified chronicity ICD-10-CM M25.511  Physician: Hermann Pradhan DO    Physician Orders: Eval and Treat  Medical Diagnosis:   M75.81,M75.82 (ICD-10-CM) - Tendinitis of both rotator cuffs   M75.21,M75.22 (ICD-10-CM) - Biceps tendinitis of both shoulders   M75.51,M75.52 (ICD-10-CM) - Subacromial bursitis of both shoulders     Surgical Procedure and Date: right shoulder arthroscopy with RTC, DCE, and biceps tenodesis  Evaluation Date: 8/14/2024  Insurance Authorization Period Expiration: 8/13/25  Plan of Care Certification Period: 8/14/24-9/25/24  Date of Return to MD: not yet scheduled  Visit # / Visits authorized: 1 / 1  FOTO: 1/ 3    Precautions:  Standard    Time In: 2:45 PM  Time Out: 3:25 PM  Total Billable Time: 40 minutes    Subjective      Date of Onset: years ago    History of Current Condition/Mechanism of Injury: Heide reports: patient experienced left shoulder pain which has worsened over the past several months. Patient reported her pain is worse with overhead activities, and her pain reduces with avoidance of overhead activities. Patient reported her pain is worse at night. She is an alternating sleeper. Patient denied any numbness or tingling. Patient also reported a history of right shoulder pain. She received a right shoulder arthroscopy and rotator cuff repair with distal clavicle excision with a biceps tenodesis approximately 2 years ago. Patient complained of an enlarged right biceps muscle following tenodesis.    Falls: none within the past 6 months    Involved Side: bilateral  Dominant Side: Right    Mechanism of Injury: no trauma noted  Surgical Procedure: right shoulder arthroscopy with RTC, DCE, and biceps tenodesis  Imaging:  Procedure: XR SHOULDER LEFT  MIN 2 VIEWS    Result Date: 8/9/2024  Narrative: Multiple views of the left shoulder were obtained. No marked glenohumeral joint degenerative changes noted. AC joint degenerative narrowing is noted. No acute fractures, subluxations, or dislocations are noted. Visible lung fields are grossly unremarkable. Soft tissues are grossly unremarkable.    Procedure: XR SHOULDER RIGHT MIN 2 VIEWS    Result Date: 8/9/2024  Narrative: Multiple views of the right shoulder were obtained. These demonstrate signs which may be consistent of a previous distal clavicle excision as well as previous anchor placement within the proximal humerus. No marked degenerative joint changes noted. No acute fractures, subluxations, or dislocations are noted. Soft tissues are grossly unremarkable. Visible lung fields are grossly unremarkable.      Prior Therapy: following right RTC repair and for chronic right shoulder pain    Pain:  Functional Pain Scale Rating 0-10:   6/10 on average  3/10 at best  8/10 at worst  Location: mid right biceps, left axilla and superior left shoulder  Description: Aching and Sharp  Aggravating Factors: Night Time and forceful motions (opening jars, brushing knotted hair)  Easing Factors: rest    Occupation:  manager at a gas station  Working presently: employed  Duties: sweeping, mopping, handling ice, lifting a case of drinks    Functional Limitations/Social History:    Previous functional status includes: Independent with all ADLs.     Current Functional Status   Home/Living environment: lives with her kids    - 1 story home, 9 steps to enter    - DME: none      Limitation of Functional Status as follows:   ADLs/IADLs:     - Feeding: patient reported pain cutting food.    - Bathing: patient reported pain reaching behind back. She has modified overhead hair washing due to pain.    - Dressing/Grooming: patient reported pain styling hair, donning/doffing overhead clothing, and doffing a jacket off.    - Home Management:  "patient reported pain deep cleaning, wiping counters, wiping walls, and hanging up clothes in closet.    - Driving: patient reported no functional challenges.     Leisure: caring for kids    Patient's Goals for Therapy: "getting my strength and being able to use my arms like I used to"    Past Medical History/Physical Systems Review:   Heide Dockery  has a past medical history of Mental disorder.    Heide Dockery  has a past surgical history that includes abdominal lap; Tubal ligation; Hysterectomy; Esophagogastroduodenoscopy (N/A, 9/24/2019); [place] tooth extraction; and Salpingoophorectomy (Right, 12/21/2020).    Heide has a current medication list which includes the following prescription(s): ergocalciferol and escitalopram oxalate.    Review of patient's allergies indicates:   Allergen Reactions    Penicillins      Other reaction(s): unknown        Objective      Sensation: Patient denies any numbness/tingling    Observation/Inspection   Left Right   Anatomic Symmetry normal normal   Bony normal normal   Suparspinatus normal normal   Infraspinatus normal normal   Rhomboid normal normal     Observation/Inspection:  normal muscle tone for age      Range of Motion:    (L) UE (R) UE       Norm   Shoulder Flexion 140 144 0-180   Shoulder Abduction 135 135 0-180   Shoulder Extension 30 40 0-50   Fxl IR lumbar lumbar    ER@0*abd 55 55 0-90   Elbow Flexion 135 135 0-90   Elbow Extension 180 180 0-90     Strength  Shoulder Flexion RUE: 4/5   Shoulder Extension RUE: 4+/5   Shoulder Abduction RUE:  4/5   Shoulder Adduction RUE:  4+/5   Internal Rotation RUE: 5/5   External Rotation RUE: 5/5   Shoulder Flexion LUE: 4/5   Shoulder Extension LUE: 4+/5   Shoulder Abduction LUE: 4/5   Shoulder Abbduction LUE: 4+/5   Internal Rotation LUE:  5/5   External Rotation LUE:  5/5     Special Tests:  Positive: Neer Impingement, Empty can test, and Speed's Test    Hand Strength (BERNARDINO Dynamometer, Setting II)   (lbs) Right  " Left    1 46 47   2 70 61   3 71 55   Avg 8/14/2024 62.3 54.3   [norms for women aged 30-34: R=78.7 +/-19.2; L=68.0 +/-17.7 (Roge et al, 1985)]    Pinch Right   8/14/2024  Left   8/14/2024    Lateral 12 12   Tip (2 point) 7 5   3 jaw nanci 10 5   [Lateral pinch norms for women aged 30-34: right: 18.7+/-3.0; left: 17.8+/-3.6 (Roge et al, 1985)]   [2-point pinch norms for women aged 30-34: right: 12.6+/-3.0; left: 11.7+/-2.8 (Roge et al, 1985)]   [3-point pinch norms for women aged 30-34: right: 19.3+/-5.0; left: 18.1+/-4.8 (Roge et al, 1985)]     Intake Outcome Measure for FOTO Shoulder Survey    Therapist reviewed FOTO scores for Heide Dockery on 8/14/2024.   FOTO report - see Media section or FOTO account episode details.    Intake Score: 50%       Treatment      Total Treatment time (time-based codes) separate from Evaluation: 15 minutes    Heide received the treatments listed below:      therapeutic exercises to develop strength, endurance, ROM, and flexibility for 15 minutes including:  Patient performed bilateral shoulder flexion table slide for 1 minute hold, 3 repetitions.  Patient performed left shoulder external rotations stretch using cane for 1 minute hold, 3 repetitions.  Patient performed right shoulder external rotations stretch using cane for 1 minute hold, 3 repetitions.  Patient performed right shoulder internal rotation towel stretch for 1 minute hold, 1 repetition.  Patient performed left shoulder internal rotation towel stretch for 1 minute hold, 1 repetition.  Patient performed scapular rows using yellow theraband for 3 sets of 10 repetitions.  Patient performed right shoulder internal rotation using yellow theraband for 3 sets of 10 repetitions.  Patient performed right shoulder external rotation using yellow theraband for 3 sets of 10 repetitions.  Patient performed left shoulder internal rotation using yellow theraband for 3 sets of 10 repetitions.  Patient  performed left shoulder external rotation using yellow theraband for 3 sets of 10 repetitions.    Patient Education and Home Exercises      Education provided:   -role of OT, goals for OT, scheduling/cancellations, insurance limitations with patient.  -Additional Education provided: home exercise program, upper extremity musculature    Written Home Exercises Provided: yes.  Exercises were reviewed and Heide was able to demonstrate them prior to the end of the session.    Heide demonstrated good  understanding of the education provided.     Pt was advised to perform these exercises free of pain, and to stop performing them if pain occurs.    See EMR under Patient Instructions for exercises provided 8/14/2024.    Assessment      Heide Dockery is a 29 y.o. female referred to outpatient occupational therapy and presents with a medical diagnosis of tendinitis of both rotator cuffs, biceps tendinitis of both shoulders, subacromial bursitis of both shoulders. Patient demonstrated decreased range of motion, muscular strength, and muscular endurance as well as pain which has impaired her engagement in ADL/IADL tasks. Patient reported greater pain when engaging in overhead task as well as any task with force. She is highly motivated to engage in therapy services so she may perform work tasks and play with her children with less pain. Patient demonstrated good form in therapeutic exercises.    Following medical record review it is determined that pt will benefit from occupational therapy services in order to maximize pain free and/or functional use of bilateral shoulders. The following goals were discussed with the patient and patient is in agreement with them as to be addressed in the treatment plan. The patient's rehab potential is Good.     Anticipated barriers to occupational therapy: None    Plan of care discussed with patient: Yes  Patient's spiritual, cultural and educational needs considered and patient is  agreeable to the plan of care and goals as stated below:     Medical Necessity is demonstrated by the following  Occupational Profile/History  Co-morbidities and personal factors that may impact the plan of care [x] LOW: Brief chart review  [] MODERATE: Expanded chart review   [] HIGH: Extensive chart review    Moderate / High Support Documentation: N/A     Examination  Performance deficits relating to physical, cognitive or psychosocial skills that result in activity limitations and/or participation restrictions  [] LOW: addressing 1-3 Performance deficits  [] MODERATE: 3-5 Performance deficits  [x] HIGH: 5+ Performance deficits (please support below)    Moderate / High Support Documentation:    Physical:  Joint Mobility  Muscle Power/Strength  Muscle Endurance   Strength  Pinch Strength  Gross Motor Coordination  Proprioception Functions  Pain    Cognitive:  No Deficits    Psychosocial:    Habits  Routines  Rituals     Treatment Options [] LOW: Limited options  [x] MODERATE: Several options  [] HIGH: Multiple options      Decision Making/ Complexity Score: moderate       The following goals were discussed with the patient and patient is in agreement with them as to be addressed in the treatment plan.     Goals:   Short-term  Patient will demonstrate independence in home exercise program by 9/4/24.   Patient will report 6/10 worst pain in bilateral shoulders during functional and therapeutic tasks by 9/4/24.  Patient will report minimal to no pain in performance of low impact household chores using bilateral upper extremities by 9/4/24.  Patient will report no pain cutting her food using bilateral upper extremities by 9/4/24.    Long-term  Patient will demonstrate improved functional use of her bilateral upper extremities in ADL/IADL tasks as evidenced by increased FOTO score of 15% by 9/25/24.   Patient will report 4/10 worst pain in bilateral shoulders during functional and therapeutic tasks by  9/25/24.  Patient will demonstrate 3 joint protection strategies to incorporate in work tasks to reduce pain and risk of injury by 9/25/24.  Patient will report minimal to no pain in performance of moderate impact household chores using bilateral upper extremities by 9/25/24.  Patient will report minimal to no pain in styling her hair using bilateral upper extremities by 9/25/24.    Plan     Plan of Care Certification: 8/14/2024 to 9/25/24.     Outpatient Occupational Therapy 2 times weekly for 6 weeks to include the following interventions: Manual therapy/joint mobilizations, Modalities for pain management, Therapeutic exercises/activities., Strengthening, Electrical Modalities, and Joint Protection.    Belle Evans OT    Physician's Signature: _________________________________________ Date: ________________

## 2024-08-15 PROBLEM — M25.512 SHOULDER PAIN, BILATERAL: Status: ACTIVE | Noted: 2024-08-15

## 2024-08-15 PROBLEM — Z78.9 IMPAIRED ACTIVITIES OF DAILY LIVING: Status: ACTIVE | Noted: 2024-08-15

## 2024-08-15 PROBLEM — M25.511 SHOULDER PAIN, BILATERAL: Status: ACTIVE | Noted: 2024-08-15

## 2024-08-16 ENCOUNTER — CLINICAL SUPPORT (OUTPATIENT)
Dept: REHABILITATION | Facility: HOSPITAL | Age: 29
End: 2024-08-16
Payer: COMMERCIAL

## 2024-08-16 DIAGNOSIS — M25.512 BILATERAL SHOULDER PAIN, UNSPECIFIED CHRONICITY: ICD-10-CM

## 2024-08-16 DIAGNOSIS — Z78.9 IMPAIRED ACTIVITIES OF DAILY LIVING: Primary | ICD-10-CM

## 2024-08-16 DIAGNOSIS — M25.511 BILATERAL SHOULDER PAIN, UNSPECIFIED CHRONICITY: ICD-10-CM

## 2024-08-16 PROCEDURE — 97110 THERAPEUTIC EXERCISES: CPT | Mod: PN

## 2024-08-16 PROCEDURE — 97140 MANUAL THERAPY 1/> REGIONS: CPT | Mod: PN

## 2024-08-16 NOTE — PROGRESS NOTES
"[facility]  Occupational Therapy Treatment Note     Date: 8/16/2024  Name: Heide Dockery  Clinic Number: [id number]    Therapy Diagnosis: impaired activities of daily living ICD-10-CM Z78.9; bilateral shoulder pain, unspecified chronicity ICD-10-CM M25.511  Physician: Hermann Pradhan DO     Physician Orders: Eval and Treat  Medical Diagnosis:   M75.81,M75.82 (ICD-10-CM) - Tendinitis of both rotator cuffs   M75.21,M75.22 (ICD-10-CM) - Biceps tendinitis of both shoulders   M75.51,M75.52 (ICD-10-CM) - Subacromial bursitis of both shoulders      Surgical Procedure and Date: right shoulder arthroscopy with RTC, DCE, and biceps tenodesis  Evaluation Date: 8/14/2024  Insurance Authorization Period Expiration: 12/31/24  Plan of Care Certification Period: 8/14/24-9/25/24  Date of Return to MD: not yet scheduled  Visit # / Visits authorized: 1 / 20  FOTO: 1/ 3     Precautions:  Standard     Time In: 2:43 PM  Time Out: 3:31 PM  Total Billable Time: 48 minutes    Subjective     Patient reports: "it's okay"  She was compliant with home exercise program given last session.   Response to previous treatment: "a little sore"  Functional change: patient reported no functional changes since last session.    Pain: 4/10  Location: bilateral shoulder      Objective     Objective Measures updated at progress report unless specified.    Treatment     Heide received the treatments listed below:      Heide received the following supervised modalities after being cleared for contradictions for  minutes:     Heide received the following direct contact modalities after being cleared for contraindications for  minutes:    manual therapy techniques: Joint mobilizations were applied to the: bilateral shoulders for 20 minutes, including:  OT performed passive range of motion left shoulder flexion for 1 minute hold, 2 repetitions.   OT performed passive range of motion left shoulder scaption for 1 minute hold, 2 " repetitions.  OT performed passive range of motion left shoulder external rotation for 1 minute hold, 2 repetitions.   OT performed passive range of motion right shoulder flexion for 1 minute hold, 2 repetitions.   OT performed passive range of motion right shoulder scaption for 1 minute hold, 2 repetitions.  OT performed passive range of motion right shoulder external rotation for 1 minute hold, 2 repetitions.    therapeutic exercises to develop strength, endurance, ROM, and flexibility for 28 minutes including:  Patient performed bilateral shoulder flexion table slide for 1 minute hold, 1 repetition.  Patient performed left shoulder external rotations stretch using cane for 1 minute hold, 1 repetition.  Patient performed right shoulder external rotations stretch using cane for 1 minute hold, 1 repetition.  Patient performed right shoulder internal rotation towel stretch for 1 minute hold, 1 repetition.  Patient performed left shoulder internal rotation towel stretch for 1 minute hold, 1 repetition.  Patient performed scapular rows using yellow theraband for 3 sets of 10 repetitions.  Patient performed right shoulder internal rotation using green theraband for 3 sets of 10 repetitions.  Patient performed right shoulder external rotation using green theraband for 2 sets of 10 repetitions.  Patient performed right shoulder external rotation using yellow theraband for 1 set of 10 repetitions.  Patient performed left shoulder internal rotation using green theraband for 3 sets of 10 repetitions.  Patient performed left shoulder external rotation using yellow theraband for 3 sets of 10 repetitions.  Patient performed bilateral biceps curls using 3# for 3 sets of 10 repetitions.  Patient performed bilateral shoulder extension using 3# for 3 sets of 10 repetitions.  Patient performed bilateral shoulder horizontal internal rotation using 1# for 3 sets of 10 repetitions.  Patient performed supine bilateral shoulder flexion  using 1# for 3 sets of 10 repetitions.  Patient performed supine bench press using 3# for 3 sets of 10 repetitions.  Patient performed supine serratus punches using 3# for 3 sets of 10 repetitions.  Patient performed supine triceps extensions using 1# for 3 sets of 10 repetitions.  Patient performed supine flies using 1# for 3 sets of 10 repetitions.    neuromuscular re-education activities to improve:  for  minutes. The following activities were included:    therapeutic activities to improve functional performance for  minutes, including:    Patient Education and Home Exercises     Education provided:   - form in therapeutic exercises   - Progress towards goals     Written Home Exercises Provided: Pt instructed to continue prior HEP.  Exercises were reviewed and Heide was able to demonstrate them prior to the end of the session.  Heide demonstrated good  understanding of the home exercise program provided. See electronic medical record under Patient Instructions for exercises provided during therapy sessions.       Assessment     Patient required verbal cue for placement of elbow in external rotation stretch in home exercise program. Patient demonstrated good muscular endurance required for all therapeutic exercises. Required to reduce theraband resistance in shoulder external rotation due to pain. Patient tolerated all other resistance well. Patient reported feeling good relief of pain following manual techniques.    Heide is progressing well towards her goals and there are no updates to goals at this time. Pt prognosis is Good.     Patient will continue to benefit from skilled outpatient occupational therapy to address the deficits listed in the problem list on initial evaluation provide patient/family education and to maximize patient's level of independence in the home and community environment.     Patient's spiritual, cultural and educational needs considered and patient agreeable to plan of care and  goals.    Anticipated barriers to occupational therapy: none    Goals:  Short-term  Patient will demonstrate independence in home exercise program by 9/4/24. IN PROGRESS  Patient will report 6/10 worst pain in bilateral shoulders during functional and therapeutic tasks by 9/4/24. IN PROGRESS  Patient will report minimal to no pain in performance of low impact household chores using bilateral upper extremities by 9/4/24. IN PROGRESS  Patient will report no pain cutting her food using bilateral upper extremities by 9/4/24. IN PROGRESS     Long-term  Patient will demonstrate improved functional use of her bilateral upper extremities in ADL/IADL tasks as evidenced by increased FOTO score of 15% by 9/25/24. IN PROGRESS  Patient will report 4/10 worst pain in bilateral shoulders during functional and therapeutic tasks by 9/25/24. IN PROGRESS  Patient will demonstrate 3 joint protection strategies to incorporate in work tasks to reduce pain and risk of injury by 9/25/24. IN PROGRESS  Patient will report minimal to no pain in performance of moderate impact household chores using bilateral upper extremities by 9/25/24. IN PROGRESS  Patient will report minimal to no pain in styling her hair using bilateral upper extremities by 9/25/24. IN PROGRESS    Plan     Updates/Grading for next session: progress strengthening as tolerated    Belle Evans, ALEX   8/16/2024

## 2024-08-21 ENCOUNTER — OFFICE VISIT (OUTPATIENT)
Dept: URGENT CARE | Facility: CLINIC | Age: 29
End: 2024-08-21
Payer: COMMERCIAL

## 2024-08-21 ENCOUNTER — CLINICAL SUPPORT (OUTPATIENT)
Dept: REHABILITATION | Facility: HOSPITAL | Age: 29
End: 2024-08-21
Payer: COMMERCIAL

## 2024-08-21 VITALS
TEMPERATURE: 98 F | SYSTOLIC BLOOD PRESSURE: 102 MMHG | OXYGEN SATURATION: 99 % | RESPIRATION RATE: 18 BRPM | HEART RATE: 65 BPM | HEIGHT: 67 IN | BODY MASS INDEX: 24.48 KG/M2 | DIASTOLIC BLOOD PRESSURE: 57 MMHG | WEIGHT: 156 LBS

## 2024-08-21 DIAGNOSIS — M25.512 BILATERAL SHOULDER PAIN, UNSPECIFIED CHRONICITY: ICD-10-CM

## 2024-08-21 DIAGNOSIS — R05.9 COUGH, UNSPECIFIED TYPE: ICD-10-CM

## 2024-08-21 DIAGNOSIS — Z78.9 IMPAIRED ACTIVITIES OF DAILY LIVING: Primary | ICD-10-CM

## 2024-08-21 DIAGNOSIS — B97.89 VIRAL RESPIRATORY ILLNESS: Primary | ICD-10-CM

## 2024-08-21 DIAGNOSIS — R09.81 SINUS CONGESTION: ICD-10-CM

## 2024-08-21 DIAGNOSIS — M25.511 BILATERAL SHOULDER PAIN, UNSPECIFIED CHRONICITY: ICD-10-CM

## 2024-08-21 DIAGNOSIS — J98.8 VIRAL RESPIRATORY ILLNESS: Primary | ICD-10-CM

## 2024-08-21 LAB
CTP QC/QA: YES
SARS-COV-2 AG RESP QL IA.RAPID: NEGATIVE

## 2024-08-21 PROCEDURE — 97110 THERAPEUTIC EXERCISES: CPT | Mod: PN

## 2024-08-21 PROCEDURE — 99214 OFFICE O/P EST MOD 30 MIN: CPT | Mod: S$GLB,,,

## 2024-08-21 PROCEDURE — 97140 MANUAL THERAPY 1/> REGIONS: CPT | Mod: PN

## 2024-08-21 PROCEDURE — 87811 SARS-COV-2 COVID19 W/OPTIC: CPT | Mod: QW,S$GLB,,

## 2024-08-21 RX ORDER — CELECOXIB 200 MG/1
200 CAPSULE ORAL
COMMUNITY
Start: 2024-08-09

## 2024-08-21 RX ORDER — FLUTICASONE PROPIONATE 50 MCG
1 SPRAY, SUSPENSION (ML) NASAL DAILY
Qty: 9.9 ML | Refills: 0 | Status: SHIPPED | OUTPATIENT
Start: 2024-08-21

## 2024-08-21 RX ORDER — LORATADINE AND PSEUDOEPHEDRINE SULFATE 5; 120 MG/1; MG/1
1 TABLET, EXTENDED RELEASE ORAL 2 TIMES DAILY
Qty: 20 TABLET | Refills: 0 | Status: SHIPPED | OUTPATIENT
Start: 2024-08-21 | End: 2024-08-31

## 2024-08-21 RX ORDER — PROMETHAZINE HYDROCHLORIDE AND DEXTROMETHORPHAN HYDROBROMIDE 6.25; 15 MG/5ML; MG/5ML
5 SYRUP ORAL EVERY 8 HOURS PRN
Qty: 120 ML | Refills: 0 | Status: SHIPPED | OUTPATIENT
Start: 2024-08-21

## 2024-08-21 NOTE — PROGRESS NOTES
"[facility]  Occupational Therapy Treatment Note     Date: 8/21/2024  Name: Heide Dockery  Clinic Number: [id number]    Therapy Diagnosis: impaired activities of daily living ICD-10-CM Z78.9; bilateral shoulder pain, unspecified chronicity ICD-10-CM M25.511  Physician: Hermann Pradhan DO     Physician Orders: Eval and Treat  Medical Diagnosis:   M75.81,M75.82 (ICD-10-CM) - Tendinitis of both rotator cuffs   M75.21,M75.22 (ICD-10-CM) - Biceps tendinitis of both shoulders   M75.51,M75.52 (ICD-10-CM) - Subacromial bursitis of both shoulders      Surgical Procedure and Date: right shoulder arthroscopy with RTC, DCE, and biceps tenodesis  Evaluation Date: 8/14/2024  Insurance Authorization Period Expiration: 12/31/24  Plan of Care Certification Period: 8/14/24-9/25/24  Date of Return to MD: not yet scheduled  Visit # / Visits authorized: 2 / 20  FOTO: 1/ 3     Precautions:  Standard     Time In: 2:47 PM  Time Out: 3:30 PM  Total Billable Time: 43 minutes    Subjective     Patient reports: "I'm doing okay."  She was compliant with home exercise program given last session.   Response to previous treatment: "I was maggie sore for 2 days, but it was fine."  Functional change: patient reported no functional changes since last session.    Pain: 4/10  Location: bilateral shoulder      Objective     Objective Measures updated at progress report unless specified.    Treatment     Heide received the treatments listed below:      Heide received the following supervised modalities after being cleared for contradictions for  minutes:     Heide received the following direct contact modalities after being cleared for contraindications for  minutes:    manual therapy techniques: Joint mobilizations were applied to the: bilateral shoulders for 16 minutes, including:  OT performed passive range of motion left shoulder flexion for 1 minute hold, 2 repetitions.   OT performed passive range of motion left " shoulder scaption for 1 minute hold, 2 repetitions.  OT performed passive range of motion left shoulder external rotation for 1 minute hold, 2 repetitions.   OT performed passive range of motion right shoulder flexion for 1 minute hold, 2 repetitions.   OT performed passive range of motion right shoulder scaption for 1 minute hold, 2 repetitions.  OT performed passive range of motion right shoulder external rotation for 1 minute hold, 2 repetitions.    therapeutic exercises to develop strength, endurance, ROM, and flexibility for 27 minutes including:  Patient performed scapular rows using green theraband for 3 sets of 10 repetitions.  Patient performed right shoulder internal rotation using green theraband for 3 sets of 10 repetitions.  Patient performed right shoulder external rotation using yellow theraband for 3 sets of 10 repetitions.  Patient performed left shoulder internal rotation using green theraband for 3 sets of 10 repetitions.  Patient performed left shoulder external rotation using yellow theraband for 3 sets of 10 repetitions.  Patient performed bilateral biceps curls using 3# for 3 sets of 10 repetitions.  Patient performed bilateral shoulder extension using 3# for 3 sets of 10 repetitions.  Patient performed bilateral shoulder horizontal internal rotation using 1# for 3 sets of 10 repetitions.  Patient performed supine bilateral shoulder flexion using 1# for 3 sets of 10 repetitions.  Patient performed supine bench press using 3# for 3 sets of 10 repetitions.  Patient performed supine serratus punches using 3# for 3 sets of 10 repetitions.  Patient performed supine triceps extensions using 1# for 3 sets of 10 repetitions.  Patient performed supine flies using 1# for 3 sets of 10 repetitions.    neuromuscular re-education activities to improve:  for  minutes. The following activities were included:    therapeutic activities to improve functional performance for  minutes, including:    Patient  Education and Home Exercises     Education provided:   - form in therapeutic exercises   - Progress towards goals     Written Home Exercises Provided: Pt instructed to continue prior HEP.  Exercises were reviewed and Heide was able to demonstrate them prior to the end of the session.  Heide demonstrated good  understanding of the home exercise program provided. See electronic medical record under Patient Instructions for exercises provided during therapy sessions.       Assessment     Patient performed all therapeutic exercises well with good form. Plan to increase weighted resistance as patient tolerated. She reported great relief of pain following manual techniques last session; continued with same report.     Heide is progressing well towards her goals. Achieved short-term goal 1. Pt prognosis is Good.     Patient will continue to benefit from skilled outpatient occupational therapy to address the deficits listed in the problem list on initial evaluation provide patient/family education and to maximize patient's level of independence in the home and community environment.     Patient's spiritual, cultural and educational needs considered and patient agreeable to plan of care and goals.    Anticipated barriers to occupational therapy: none    Goals:  Short-term  Patient will demonstrate independence in home exercise program by 9/4/24. GOAL MET  Patient will report 6/10 worst pain in bilateral shoulders during functional and therapeutic tasks by 9/4/24. IN PROGRESS  Patient will report minimal to no pain in performance of low impact household chores using bilateral upper extremities by 9/4/24. IN PROGRESS  Patient will report no pain cutting her food using bilateral upper extremities by 9/4/24. IN PROGRESS     Long-term  Patient will demonstrate improved functional use of her bilateral upper extremities in ADL/IADL tasks as evidenced by increased FOTO score of 15% by 9/25/24. IN PROGRESS  Patient will report  4/10 worst pain in bilateral shoulders during functional and therapeutic tasks by 9/25/24. IN PROGRESS  Patient will demonstrate 3 joint protection strategies to incorporate in work tasks to reduce pain and risk of injury by 9/25/24. IN PROGRESS  Patient will report minimal to no pain in performance of moderate impact household chores using bilateral upper extremities by 9/25/24. IN PROGRESS  Patient will report minimal to no pain in styling her hair using bilateral upper extremities by 9/25/24. IN PROGRESS    Plan     Updates/Grading for next session: progress strengthening as tolerated. Introduce box lift in preparation for work tasks.    Belle Evans, OT   8/21/2024

## 2024-08-21 NOTE — PROGRESS NOTES
"Subjective:       Patient ID: Heide Dockery is a 29 y.o. female.    Vitals:  height is 5' 7" (1.702 m) and weight is 70.8 kg (156 lb). Her oral temperature is 98.2 °F (36.8 °C). Her blood pressure is 102/57 (abnormal) and her pulse is 65. Her respiration is 18 and oxygen saturation is 99%.     Chief Complaint: Sinus Problem    This is a 29 y.o. female who presents today with a chief complaint of sore throat x 3 days ago that resolved. Now she just has nasal congestion. Took tylenol sinus with some relief.  Patient presents with:  Sinus Problem         Sinus Problem  This is a new problem. The current episode started in the past 7 days. The problem has been gradually worsening since onset. There has been no fever. Her pain is at a severity of 0/10. She is experiencing no pain. Associated symptoms include congestion, coughing and a sore throat. Treatments tried: tylenol sinus. The treatment provided moderate relief.       Constitution: Negative.   HENT:  Positive for congestion, postnasal drip and sore throat.    Neck: neck negative.   Cardiovascular: Negative.    Eyes: Negative.    Respiratory:  Positive for cough.    Gastrointestinal: Negative.    Endocrine: negative.   Genitourinary: Negative.    Musculoskeletal: Negative.    Skin: Negative.    Allergic/Immunologic: Negative.    Neurological: Negative.    Hematologic/Lymphatic: Negative.    Psychiatric/Behavioral: Negative.             Objective:      Physical Exam   Constitutional: She is oriented to person, place, and time.   HENT:   Head: Normocephalic and atraumatic.   Ears:   Right Ear: Tympanic membrane, external ear and ear canal normal.   Left Ear: Tympanic membrane, external ear and ear canal normal.   Nose: Congestion present.   Mouth/Throat: Posterior oropharyngeal erythema present.   Eyes: Conjunctivae are normal. Pupils are equal, round, and reactive to light. Extraocular movement intact   Neck: Neck supple.   Cardiovascular: Normal rate, regular " rhythm, normal heart sounds and normal pulses.   Pulmonary/Chest: Effort normal and breath sounds normal.   Abdominal: Normal appearance.   Musculoskeletal: Normal range of motion.         General: Normal range of motion.   Neurological: no focal deficit. She is alert, oriented to person, place, and time and at baseline.   Skin: Skin is warm.   Psychiatric: Her behavior is normal. Mood, judgment and thought content normal.   Nursing note and vitals reviewed.        Past medical history and current medications reviewed.       Assessment:           1. Viral respiratory illness    2. Sinus congestion    3. Cough, unspecified type          Results for orders placed or performed in visit on 08/21/24   SARS Coronavirus 2 Antigen, POCT Manual Read   Result Value Ref Range    SARS Coronavirus 2 Antigen Negative Negative     Acceptable Yes         Plan:         Viral respiratory illness  -     promethazine-dextromethorphan (PROMETHAZINE-DM) 6.25-15 mg/5 mL Syrp; Take 5 mLs by mouth every 8 (eight) hours as needed (cough).  Dispense: 120 mL; Refill: 0  -     fluticasone propionate (FLONASE) 50 mcg/actuation nasal spray; 1 spray (50 mcg total) by Each Nostril route once daily.  Dispense: 9.9 mL; Refill: 0  -     loratadine-pseudoephedrine 5-120 mg (CLARITIN-D 12 HOUR) 5-120 mg per tablet; Take 1 tablet by mouth 2 (two) times daily. for 10 days  Dispense: 20 tablet; Refill: 0    Sinus congestion  -     SARS Coronavirus 2 Antigen, POCT Manual Read  -     promethazine-dextromethorphan (PROMETHAZINE-DM) 6.25-15 mg/5 mL Syrp; Take 5 mLs by mouth every 8 (eight) hours as needed (cough).  Dispense: 120 mL; Refill: 0  -     fluticasone propionate (FLONASE) 50 mcg/actuation nasal spray; 1 spray (50 mcg total) by Each Nostril route once daily.  Dispense: 9.9 mL; Refill: 0  -     loratadine-pseudoephedrine 5-120 mg (CLARITIN-D 12 HOUR) 5-120 mg per tablet; Take 1 tablet by mouth 2 (two) times daily. for 10 days  Dispense:  20 tablet; Refill: 0    Cough, unspecified type  -     promethazine-dextromethorphan (PROMETHAZINE-DM) 6.25-15 mg/5 mL Syrp; Take 5 mLs by mouth every 8 (eight) hours as needed (cough).  Dispense: 120 mL; Refill: 0  -     fluticasone propionate (FLONASE) 50 mcg/actuation nasal spray; 1 spray (50 mcg total) by Each Nostril route once daily.  Dispense: 9.9 mL; Refill: 0  -     loratadine-pseudoephedrine 5-120 mg (CLARITIN-D 12 HOUR) 5-120 mg per tablet; Take 1 tablet by mouth 2 (two) times daily. for 10 days  Dispense: 20 tablet; Refill: 0             Patient Instructions   Please return here or go to the Emergency Department for any concerns or worsening of condition.  Please drink plenty of fluids.  Please get plenty of rest.  If you were prescribed antibiotics, please take them to completion.  If you were given wait & see antibiotics, please wait 5-7 days before taking them, and only take them if your symptoms have worsened or not improved.  If you do begin taking the antibiotics, please take them to completion.  If you were given a steroid shot in the clinic and have also been given a prescription for a steroid such as Prednisone or a Medrol Dose Pack, please begin taking them tomorrow.  If you do not have Hypertension or any history of palpitations, it is ok to take over the counter Sudafed or Mucinex D or Allegra-D or Claritin-D or Zyrtec-D.  If you do take one of the above, it is ok to combine that with plain over the counter Mucinex or Allegra or Claritin or Zyrtec.  If for example you are taking Zyrtec -D, you can combine that with Mucinex, but not Mucinex-D.  If you are taking Mucinex-D, you can combine that with plain Allegra or Claritin or Zyrtec.   If you do have Hypertension or palpitations, it is safe to take Coricidin HBP for relief of sinus symptoms.  If not allergic, please take over the counter Tylenol (Acetaminophen) and/or Motrin (Ibuprofen) as directed for control of pain and/or fever.  Please  follow up with your primary care doctor or specialist as needed.    If you  smoke, please stop smoking.

## 2024-08-28 ENCOUNTER — CLINICAL SUPPORT (OUTPATIENT)
Dept: REHABILITATION | Facility: HOSPITAL | Age: 29
End: 2024-08-28
Payer: COMMERCIAL

## 2024-08-28 DIAGNOSIS — Z78.9 IMPAIRED ACTIVITIES OF DAILY LIVING: Primary | ICD-10-CM

## 2024-08-28 DIAGNOSIS — M25.511 BILATERAL SHOULDER PAIN, UNSPECIFIED CHRONICITY: ICD-10-CM

## 2024-08-28 DIAGNOSIS — M25.512 BILATERAL SHOULDER PAIN, UNSPECIFIED CHRONICITY: ICD-10-CM

## 2024-08-28 PROCEDURE — 97140 MANUAL THERAPY 1/> REGIONS: CPT | Mod: PN

## 2024-08-28 PROCEDURE — 97110 THERAPEUTIC EXERCISES: CPT | Mod: PN

## 2024-08-28 NOTE — PROGRESS NOTES
"[facility]  Occupational Therapy Treatment Note     Date: 8/28/2024  Name: Heide Dockery  Clinic Number: [id number]    Therapy Diagnosis: impaired activities of daily living ICD-10-CM Z78.9; bilateral shoulder pain, unspecified chronicity ICD-10-CM M25.511  Physician: Hermann Pradhan DO     Physician Orders: Eval and Treat  Medical Diagnosis:   M75.81,M75.82 (ICD-10-CM) - Tendinitis of both rotator cuffs   M75.21,M75.22 (ICD-10-CM) - Biceps tendinitis of both shoulders   M75.51,M75.52 (ICD-10-CM) - Subacromial bursitis of both shoulders      Surgical Procedure and Date: right shoulder arthroscopy with RTC, DCE, and biceps tenodesis  Evaluation Date: 8/14/2024  Insurance Authorization Period Expiration: 12/31/24  Plan of Care Certification Period: 8/14/24-9/25/24  Date of Return to MD: not yet scheduled  Visit # / Visits authorized: 3 / 20  FOTO: 1/ 3     Precautions:  Standard     Time In: 2:42 PM  Time Out: 3:29 PM  Total Billable Time: 47 minutes    Subjective     Patient reports: "I'm feeling a little sore."  She was compliant with home exercise program given last session.   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    Patient's right biceps was tender to palpation. Frank deformity present, but was noted present at evaluation.    Treatment     Heide received the treatments listed below:      Heide received the following supervised modalities after being cleared for contradictions for  minutes:     Heide received the following direct contact modalities after being cleared for contraindications for  minutes:    manual therapy techniques: Joint mobilizations were applied to the: bilateral shoulders for 16 " minutes, including:  OT performed passive range of motion left shoulder flexion for 1 minute hold, 2 repetitions.   OT performed passive range of motion left shoulder scaption for 1 minute hold, 2 repetitions.  OT performed passive range of motion left shoulder external rotation for 1 minute hold, 2 repetitions.   OT performed passive range of motion right shoulder flexion for 1 minute hold, 2 repetitions.   OT performed passive range of motion right shoulder scaption for 1 minute hold, 2 repetitions.  OT performed passive range of motion right shoulder external rotation for 1 minute hold, 2 repetitions.    therapeutic exercises to develop strength, endurance, ROM, and flexibility for 28 minutes including:  Patient performed scapular rows using green theraband for 3 sets of 10 repetitions.  Patient performed right shoulder internal rotation using green theraband for 3 sets of 10 repetitions.  Patient performed right shoulder external rotation using yellow theraband for 3 sets of 10 repetitions.  Patient performed left shoulder internal rotation using green theraband for 3 sets of 10 repetitions.  Patient performed left shoulder external rotation using yellow theraband for 3 sets of 10 repetitions.  Patient performed bilateral biceps curls using 4# for 3 sets of 10 repetitions.  Patient performed bilateral shoulder extension using 4# for 3 sets of 10 repetitions.  Patient performed bilateral shoulder horizontal internal rotation using 2# for 3 sets of 10 repetitions.  Patient performed supine bilateral shoulder flexion using 2# for 3 sets of 10 repetitions.  Patient performed supine bench press using 4# for 3 sets of 10 repetitions.  Patient performed supine serratus punches using 4# for 3 sets of 10 repetitions.  Patient performed supine triceps extensions using 2# for 3 sets of 10 repetitions.  Patient performed supine flies using 2# for 3 sets of 10 repetitions.    neuromuscular re-education activities to improve:   for  minutes. The following activities were included:    therapeutic activities to improve functional performance for 3 minutes, including:  Patient performed 17# box lift for 3 sets of 10 repetitions.    Patient Education and Home Exercises     Education provided:   - form in therapeutic exercises and box lift  - Progress towards goals     Written Home Exercises Provided: Pt instructed to continue prior HEP.  Exercises were reviewed and Heide was able to demonstrate them prior to the end of the session.  Heide demonstrated good  understanding of the home exercise program provided. See electronic medical record under Patient Instructions for exercises provided during therapy sessions.       Assessment     Patient complained of right biceps soreness following lifting this weekend. 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 Patient performed well with good form and body mechanics which she feels confident she can replicate at work. Patient performed all therapeutic exercises well using increased weighted resistance. She continued to state manual stretches were beneficial for pain relief.    Heide is progressing well towards her goals. Achieved short-term goal 1. Pt prognosis is Good.     Patient will continue to benefit from skilled outpatient occupational therapy to address the deficits listed in the problem list on initial evaluation provide patient/family education and to maximize patient's level of independence in the home and community environment.     Patient's spiritual, cultural and educational needs considered and patient agreeable to plan of care and goals.    Anticipated barriers to occupational therapy: none    Goals:  Short-term  Patient will demonstrate independence in home exercise program by 9/4/24. GOAL MET  Patient will report 6/10 worst pain in bilateral shoulders during functional and therapeutic tasks by 9/4/24. IN PROGRESS  Patient will report minimal to  no pain in performance of low impact household chores using bilateral upper extremities by 9/4/24. IN PROGRESS  Patient will report no pain cutting her food using bilateral upper extremities by 9/4/24. IN PROGRESS     Long-term  Patient will demonstrate improved functional use of her bilateral upper extremities in ADL/IADL tasks as evidenced by increased FOTO score of 15% by 9/25/24. IN PROGRESS  Patient will report 4/10 worst pain in bilateral shoulders during functional and therapeutic tasks by 9/25/24. IN PROGRESS  Patient will demonstrate 3 joint protection strategies to incorporate in work tasks to reduce pain and risk of injury by 9/25/24. IN PROGRESS  Patient will report minimal to no pain in performance of moderate impact household chores using bilateral upper extremities by 9/25/24. IN PROGRESS  Patient will report minimal to no pain in styling her hair using bilateral upper extremities by 9/25/24. IN PROGRESS    Plan     Updates/Grading for next session: progress strengthening as tolerated.     Belle Evans, OT   8/28/2024

## 2024-09-12 NOTE — PROGRESS NOTES
"[facility]  Occupational Therapy Treatment Note     Date: 9/13/2024  Name: Heide Dockery  Clinic Number: [id number]    Therapy Diagnosis: impaired activities of daily living ICD-10-CM Z78.9; bilateral shoulder pain, unspecified chronicity ICD-10-CM M25.511  Physician: Hermann Pradhan DO     Physician Orders: Eval and Treat  Medical Diagnosis:   M75.81,M75.82 (ICD-10-CM) - Tendinitis of both rotator cuffs   M75.21,M75.22 (ICD-10-CM) - Biceps tendinitis of both shoulders   M75.51,M75.52 (ICD-10-CM) - Subacromial bursitis of both shoulders      Surgical Procedure and Date: right shoulder arthroscopy with RTC, DCE, and biceps tenodesis  Evaluation Date: 8/14/2024  Insurance Authorization Period Expiration: 12/31/24  Plan of Care Certification Period: 8/14/24-9/25/24  Date of Return to MD: not yet scheduled  Visit # / Visits authorized: 4 / 20  FOTO: 1/ 3     Precautions:  Standard     Time In: 2:45 PM  Time Out: 3:31 PM  Total Billable Time: 46 minutes    Subjective     Patient reports: her biceps continued to bother after irritating it by lifting.  She was compliant with home exercise program given last session.   Response to previous treatment: "good."  Functional change: patient reported no functional changes since last session.    Pain: 5/10  Location: bilateral shoulder      Objective     Objective Measures updated at progress report unless specified.    Treatment     Heide received the treatments listed below:      Heide received the following supervised modalities after being cleared for contradictions for  minutes:     Heide received the following direct contact modalities after being cleared for contraindications for  minutes:    manual therapy techniques: Joint mobilizations were applied to the: bilateral shoulders for 16 minutes, including:  OT performed passive range of motion left shoulder flexion for 1 minute hold, 2 repetitions.   OT performed passive range of motion " left shoulder scaption for 1 minute hold, 2 repetitions.  OT performed passive range of motion left shoulder external rotation for 1 minute hold, 2 repetitions.   OT performed passive range of motion right shoulder flexion for 1 minute hold, 2 repetitions.   OT performed passive range of motion right shoulder scaption for 1 minute hold, 2 repetitions.  OT performed passive range of motion right shoulder external rotation for 1 minute hold, 2 repetitions.    therapeutic exercises to develop strength, endurance, ROM, and flexibility for 27 minutes including:  Patient performed scapular rows using black theraband for 3 sets of 10 repetitions.  Patient performed right shoulder internal rotation using green theraband for 3 sets of 10 repetitions.  Patient performed right shoulder external rotation using green theraband for 3 sets of 10 repetitions.  Patient performed left shoulder internal rotation using green theraband for 3 sets of 10 repetitions.  Patient performed left shoulder external rotation using green theraband for 3 sets of 10 repetitions.  Patient performed bilateral biceps curls using 4# for 3 sets of 10 repetitions.  Patient performed bilateral shoulder extension using 4# for 3 sets of 10 repetitions.  Patient performed bilateral shoulder horizontal internal rotation using 2# for 3 sets of 10 repetitions.  Patient performed supine bilateral shoulder flexion using 2# for 3 sets of 10 repetitions.  Patient performed supine bench press using 4# for 3 sets of 10 repetitions.  Patient performed supine serratus punches using 4# for 3 sets of 10 repetitions.  Patient performed supine triceps extensions using 2# for 3 sets of 10 repetitions.  Patient performed supine flies using 2# for 3 sets of 10 repetitions.    neuromuscular re-education activities to improve:  for  minutes. The following activities were included:    therapeutic activities to improve functional performance for 3 minutes, including:  Patient  performed 17# box lift for 3 sets of 10 repetitions.    Patient Education and Home Exercises     Education provided:   - form in therapeutic exercises   - Progress towards goals     Written Home Exercises Provided: Pt instructed to continue prior HEP.  Exercises were reviewed and Heide was able to demonstrate them prior to the end of the session.  Heide demonstrated good  understanding of the home exercise program provided. See electronic medical record under Patient Instructions for exercises provided during therapy sessions.       Assessment     Patient continued to report biceps soreness following exacerbation lifting. Patient performed all therapeutic exercises with good form. She progressed resistance using theraband, indicating improving strength. Patient reported relief of muscular tension with manual stretches.    Heide is progressing well towards her goals. Achieved short-term goal 4. Updated short-term goals 2 and 3. Pt prognosis is Good.     Patient will continue to benefit from skilled outpatient occupational therapy to address the deficits listed in the problem list on initial evaluation provide patient/family education and to maximize patient's level of independence in the home and community environment.     Patient's spiritual, cultural and educational needs considered and patient agreeable to plan of care and goals.    Anticipated barriers to occupational therapy: none    Goals:  Short-term  Patient will demonstrate independence in home exercise program by 9/4/24. GOAL MET  Patient will report 6/10 worst pain in bilateral shoulders during functional and therapeutic tasks by 9/4/24.   UPDATED 9/13/24: Patient will report 6/10 worst pain in bilateral shoulders during functional and therapeutic tasks by 9/25/24.   Patient will report minimal to no pain in performance of low impact household chores using bilateral upper extremities by 9/4/24.   UPDATED 9/13/24: Patient will report minimal to no  pain in performance of low impact household chores using bilateral upper extremities by 9/25/24.   Patient will report no pain cutting her food using bilateral upper extremities by 9/4/24. GOAL MET  Long-term  Patient will demonstrate improved functional use of her bilateral upper extremities in ADL/IADL tasks as evidenced by increased FOTO score of 15% by 9/25/24. IN PROGRESS  Patient will report 4/10 worst pain in bilateral shoulders during functional and therapeutic tasks by 9/25/24. IN PROGRESS  Patient will demonstrate 3 joint protection strategies to incorporate in work tasks to reduce pain and risk of injury by 9/25/24. IN PROGRESS  Patient will report minimal to no pain in performance of moderate impact household chores using bilateral upper extremities by 9/25/24. IN PROGRESS  Patient will report minimal to no pain in styling her hair using bilateral upper extremities by 9/25/24. IN PROGRESS    Plan     Updates/Grading for next session: progress strengthening as tolerated.     Belle Evans, ALEX   9/13/2024

## 2024-09-13 ENCOUNTER — CLINICAL SUPPORT (OUTPATIENT)
Dept: REHABILITATION | Facility: HOSPITAL | Age: 29
End: 2024-09-13
Payer: COMMERCIAL

## 2024-09-13 DIAGNOSIS — M25.511 BILATERAL SHOULDER PAIN, UNSPECIFIED CHRONICITY: ICD-10-CM

## 2024-09-13 DIAGNOSIS — M25.512 BILATERAL SHOULDER PAIN, UNSPECIFIED CHRONICITY: ICD-10-CM

## 2024-09-13 DIAGNOSIS — Z78.9 IMPAIRED ACTIVITIES OF DAILY LIVING: Primary | ICD-10-CM

## 2024-09-13 PROCEDURE — 97140 MANUAL THERAPY 1/> REGIONS: CPT | Mod: PN

## 2024-09-13 PROCEDURE — 97110 THERAPEUTIC EXERCISES: CPT | Mod: PN

## 2024-09-18 ENCOUNTER — CLINICAL SUPPORT (OUTPATIENT)
Dept: REHABILITATION | Facility: HOSPITAL | Age: 29
End: 2024-09-18
Payer: COMMERCIAL

## 2024-09-18 DIAGNOSIS — M25.512 BILATERAL SHOULDER PAIN, UNSPECIFIED CHRONICITY: ICD-10-CM

## 2024-09-18 DIAGNOSIS — Z78.9 IMPAIRED ACTIVITIES OF DAILY LIVING: Primary | ICD-10-CM

## 2024-09-18 DIAGNOSIS — M25.511 BILATERAL SHOULDER PAIN, UNSPECIFIED CHRONICITY: ICD-10-CM

## 2024-09-18 PROCEDURE — 97140 MANUAL THERAPY 1/> REGIONS: CPT | Mod: PN

## 2024-09-18 PROCEDURE — 97110 THERAPEUTIC EXERCISES: CPT | Mod: PN

## 2024-09-18 NOTE — PROGRESS NOTES
"[facility]  Occupational Therapy Treatment Note     Date: 9/18/2024  Name: Heide Dockery  Clinic Number: [id number]    Therapy Diagnosis: impaired activities of daily living ICD-10-CM Z78.9; bilateral shoulder pain, unspecified chronicity ICD-10-CM M25.511  Physician: Hermann Pradhan DO     Physician Orders: Eval and Treat  Medical Diagnosis:   M75.81,M75.82 (ICD-10-CM) - Tendinitis of both rotator cuffs   M75.21,M75.22 (ICD-10-CM) - Biceps tendinitis of both shoulders   M75.51,M75.52 (ICD-10-CM) - Subacromial bursitis of both shoulders      Surgical Procedure and Date: right shoulder arthroscopy with RTC, DCE, and biceps tenodesis  Evaluation Date: 8/14/2024  Insurance Authorization Period Expiration: 12/31/24  Plan of Care Certification Period: 8/14/24-9/25/24  Date of Return to MD: not yet scheduled  Visit # / Visits authorized: 5 / 20  FOTO: 2/ 3     Precautions:  Standard     Time In: 2:53 PM  Time Out: 3:43 PM  Total Billable Time: 46 minutes    Subjective     Patient reports: "I'm doing good."  She was compliant with home exercise program given last session.   Response to previous treatment: "good."  Functional change: patient reported no functional changes since last session.    Pain: 5/10  Location: bilateral shoulder      Objective     Objective Measures updated at progress report unless specified.    FOTO: 62%    Treatment     Heide received the treatments listed below:      Heide received the following supervised modalities after being cleared for contradictions for  minutes:     Heide received the following direct contact modalities after being cleared for contraindications for  minutes:    manual therapy techniques: Joint mobilizations were applied to the: bilateral shoulders for 16 minutes, including:  OT performed passive range of motion left shoulder flexion for 1 minute hold, 2 repetitions.   OT performed passive range of motion left shoulder scaption for 1 " minute hold, 2 repetitions.  OT performed passive range of motion left shoulder external rotation for 1 minute hold, 2 repetitions.   OT performed passive range of motion right shoulder flexion for 1 minute hold, 2 repetitions.   OT performed passive range of motion right shoulder scaption for 1 minute hold, 2 repetitions.  OT performed passive range of motion right shoulder external rotation for 1 minute hold, 2 repetitions.    therapeutic exercises to develop strength, endurance, ROM, and flexibility for 27 minutes including:  Patient performed scapular rows using black theraband for 3 sets of 10 repetitions.  Patient performed right shoulder internal rotation using black theraband for 3 sets of 10 repetitions.  Patient performed right shoulder external rotation using black theraband for 3 sets of 10 repetitions.  Patient performed left shoulder internal rotation using black theraband for 3 sets of 10 repetitions.  Patient performed left shoulder external rotation using black theraband for 3 sets of 10 repetitions.  Patient performed bilateral biceps curls using 5# for 3 sets of 10 repetitions.  Patient performed bilateral shoulder extension using 5# for 3 sets of 10 repetitions.  Patient performed bilateral shoulder horizontal internal rotation using 5# for 3 sets of 10 repetitions.  Patient performed supine bilateral shoulder flexion using 3# for 3 sets of 10 repetitions.  Patient performed supine bench press using 5# for 3 sets of 10 repetitions.  Patient performed supine serratus punches using 5# for 3 sets of 10 repetitions.  Patient performed supine triceps extensions using 3# for 3 sets of 10 repetitions.  Patient performed supine flies using 3# for 3 sets of 10 repetitions.    neuromuscular re-education activities to improve:  for  minutes. The following activities were included:    therapeutic activities to improve functional performance for 3 minutes, including:  Patient performed 22# box lift for 3 sets  of 10 repetitions.    Patient Education and Home Exercises     Education provided:   - form in therapeutic exercises   - Progress towards goals     Written Home Exercises Provided: Pt instructed to continue prior HEP.  Exercises were reviewed and Heide was able to demonstrate them prior to the end of the session.  Heide demonstrated good  understanding of the home exercise program provided. See electronic medical record under Patient Instructions for exercises provided during therapy sessions.       Assessment     Patient reported her shoulder pain remains constant. She stated manual stretches do provide relief of pain. Patient performed all therapeutic exercises well with good form using increased resistance as patient demonstrated improved strength. Added 5# to box lift with patient reporting greater stretch. Plan to incorporate glenohumeral stabilization and proprioceptive activities next session.    Heide is progressing well towards her goals. No updates. Pt prognosis is Good.     Patient will continue to benefit from skilled outpatient occupational therapy to address the deficits listed in the problem list on initial evaluation provide patient/family education and to maximize patient's level of independence in the home and community environment.     Patient's spiritual, cultural and educational needs considered and patient agreeable to plan of care and goals.    Anticipated barriers to occupational therapy: none    Goals:  Short-term  Patient will demonstrate independence in home exercise program by 9/4/24. GOAL MET  Patient will report 6/10 worst pain in bilateral shoulders during functional and therapeutic tasks by 9/4/24.   UPDATED 9/13/24: Patient will report 6/10 worst pain in bilateral shoulders during functional and therapeutic tasks by 9/25/24.   Patient will report minimal to no pain in performance of low impact household chores using bilateral upper extremities by 9/4/24.   UPDATED 9/13/24:  Patient will report minimal to no pain in performance of low impact household chores using bilateral upper extremities by 9/25/24.   Patient will report no pain cutting her food using bilateral upper extremities by 9/4/24. GOAL MET  Long-term  Patient will demonstrate improved functional use of her bilateral upper extremities in ADL/IADL tasks as evidenced by increased FOTO score of 15% by 9/25/24. IN PROGRESS  Patient will report 4/10 worst pain in bilateral shoulders during functional and therapeutic tasks by 9/25/24. IN PROGRESS  Patient will demonstrate 3 joint protection strategies to incorporate in work tasks to reduce pain and risk of injury by 9/25/24. IN PROGRESS  Patient will report minimal to no pain in performance of moderate impact household chores using bilateral upper extremities by 9/25/24. IN PROGRESS  Patient will report minimal to no pain in styling her hair using bilateral upper extremities by 9/25/24. IN PROGRESS    Plan     Updates/Grading for next session: incorporate glenohumeral stabilization and proprioceptive activities next session.    Belle Evans, ALEX   9/18/2024

## 2024-09-25 ENCOUNTER — CLINICAL SUPPORT (OUTPATIENT)
Dept: REHABILITATION | Facility: HOSPITAL | Age: 29
End: 2024-09-25
Payer: COMMERCIAL

## 2024-09-25 DIAGNOSIS — Z78.9 IMPAIRED ACTIVITIES OF DAILY LIVING: Primary | ICD-10-CM

## 2024-09-25 DIAGNOSIS — M25.511 BILATERAL SHOULDER PAIN, UNSPECIFIED CHRONICITY: ICD-10-CM

## 2024-09-25 DIAGNOSIS — M25.512 BILATERAL SHOULDER PAIN, UNSPECIFIED CHRONICITY: ICD-10-CM

## 2024-09-25 PROCEDURE — 97110 THERAPEUTIC EXERCISES: CPT | Mod: PN

## 2024-09-25 PROCEDURE — 97014 ELECTRIC STIMULATION THERAPY: CPT | Mod: PN

## 2024-09-25 NOTE — PROGRESS NOTES
"[facility]  Occupational Therapy Treatment Note     Date: 9/25/2024  Name: Heide Dockery  Clinic Number: [id number]    Therapy Diagnosis: impaired activities of daily living ICD-10-CM Z78.9; bilateral shoulder pain, unspecified chronicity ICD-10-CM M25.511  Physician: Hermann Pradhan DO     Physician Orders: Eval and Treat  Medical Diagnosis:   M75.81,M75.82 (ICD-10-CM) - Tendinitis of both rotator cuffs   M75.21,M75.22 (ICD-10-CM) - Biceps tendinitis of both shoulders   M75.51,M75.52 (ICD-10-CM) - Subacromial bursitis of both shoulders      Surgical Procedure and Date: right shoulder arthroscopy with RTC, DCE, and biceps tenodesis  Evaluation Date: 8/14/2024  Insurance Authorization Period Expiration: 12/31/24  Plan of Care Certification Period: 8/14/24-10/30/24  Date of Return to MD: not yet scheduled  Visit # / Visits authorized: 6 / 20  FOTO: 2/ 3     Precautions:  Standard     Time In: 2:47 PM  Time Out: 3:36 PM  Total Billable Time: 49 minutes    Subjective     Patient reports: "I'm good."  She was compliant with home exercise program given last session.   Response to previous treatment: "good."  Functional change: patient reported no functional changes since last session.    Pain: 4/10  Location: bilateral shoulder      Objective     Objective Measures updated at progress report unless specified.    Treatment     Heide received the treatments listed below:      Heide received the following supervised modalities after being cleared for contradictions for  10 minutes:   IFC Electrical Stimulation:  Heide received IFC Electrical Stimulation for pain control applied to the right biceps and shoulder. Pt received stimulation at 100 % scan at a frequency of 5.0 volts for 10 minutes. Heide tolerated treatment well without any adverse effects.      Heide received the following direct contact modalities after being cleared for contraindications for  minutes:    manual therapy " "techniques: Joint mobilizations were applied to the: bilateral shoulders for 0 minutes, including:  OT performed passive range of motion left shoulder flexion for 1 minute hold, 2 repetitions.   OT performed passive range of motion left shoulder scaption for 1 minute hold, 2 repetitions.  OT performed passive range of motion left shoulder external rotation for 1 minute hold, 2 repetitions.   OT performed passive range of motion right shoulder flexion for 1 minute hold, 2 repetitions.   OT performed passive range of motion right shoulder scaption for 1 minute hold, 2 repetitions.  OT performed passive range of motion right shoulder external rotation for 1 minute hold, 2 repetitions.    therapeutic exercises to develop strength, endurance, ROM, and flexibility for 39 minutes including:  Patient performed right and left shoulder glenohumeral stability and strengthening to "write" the alphabet on the wall with shoulder flexed to 90 degrees, 1 repetition.  Patient performed forward lean onto wall with weightbearing into proximal bilateral humerus with theraband resistance while flexing bilateral shoulders and engaging deltoid, serratus, and trapezius to promote scapular rhythm while compressing the humeral head and elevating the scapula to reduce shoulder hike for 2 sets of 10 repetitions.   Patient performed left shoulder internal rotation using black theraband for 3 sets of 10 repetitions.  Patient performed left shoulder external rotation using black theraband for 3 sets of 10 repetitions.  Patient performed bilateral biceps curls using 5# for 3 sets of 10 repetitions.  Patient performed bilateral shoulder extension using 5# for 3 sets of 10 repetitions.  Patient performed bilateral shoulder horizontal internal rotation using 5# for 3 sets of 10 repetitions.  Patient performed supine bilateral shoulder flexion using 3# for 3 sets of 10 repetitions.  Patient performed supine bench press using 5# for 3 sets of 10 " repetitions.  Patient performed supine serratus punches using 5# for 3 sets of 10 repetitions.  Patient performed supine triceps extensions using 3# for 3 sets of 10 repetitions.  Patient performed supine flies using 3# for 3 sets of 10 repetitions.    neuromuscular re-education activities to improve:  for  minutes. The following activities were included:    therapeutic activities to improve functional performance for  minutes, including:    Patient Education and Home Exercises     Education provided:   - form in therapeutic exercises   - Progress towards goals     Written Home Exercises Provided: Pt instructed to continue prior HEP.  Exercises were reviewed and Heide was able to demonstrate them prior to the end of the session.  Heide demonstrated good  understanding of the home exercise program provided. See electronic medical record under Patient Instructions for exercises provided during therapy sessions.       Assessment     OT introduced banded scapular wall slides and wall alphabet for enhanced glenohumeral proprioception. Patient reported feeling muscular fatigue with wall alphabet, and OT encouraged rest break to prevent exacerbation of pain. Will continue to work up to no rest breaks to progress muscular endurance. She reported wall slides decreased her pain. Patient performed all therapeutic exercises well with good form using increased resistance as patient demonstrated improved strength. Patient complained that all exercises irritate her right biceps, and her right biceps swells with increased pain in functional use. Initiated e-stim as patient stated this previously gave her pain relief after RCR. At conclusion of e-stim, patient reported good relief of pain following application.    Heide is progressing well towards her goals. No updates. Pt prognosis is Good.     Patient will continue to benefit from skilled outpatient occupational therapy to address the deficits listed in the problem list on  initial evaluation provide patient/family education and to maximize patient's level of independence in the home and community environment.     Patient's spiritual, cultural and educational needs considered and patient agreeable to plan of care and goals.    Anticipated barriers to occupational therapy: none    Previous Short Term Goals Status:  achieved short-term goals 1 and 4  New Short Term Goals Status: continue unmet  Long Term Goal Status: continue per initial plan of care.  Reasons for Recertification of Therapy:   patient missed several visits due to scheduling conflicts/weather    Goals:  Short-term  Patient will demonstrate independence in home exercise program by 9/4/24. GOAL MET  Patient will report 6/10 worst pain in bilateral shoulders during functional and therapeutic tasks by 9/4/24.   UPDATED 9/13/24: Patient will report 6/10 worst pain in bilateral shoulders during functional and therapeutic tasks by 9/25/24.   UPDATED 9/25/24: Patient will report 6/10 worst pain in bilateral shoulders during functional and therapeutic tasks by 10/16/24.   Patient will report minimal to no pain in performance of low impact household chores using bilateral upper extremities by 9/4/24.   UPDATED 9/13/24: Patient will report minimal to no pain in performance of low impact household chores using bilateral upper extremities by 9/25/24.   UPDATED 9/25/24: Patient will report minimal to no pain in performance of low impact household chores using bilateral upper extremities by 10/16/24.   Patient will report no pain cutting her food using bilateral upper extremities by 9/4/24. GOAL MET  Long-term  Patient will demonstrate improved functional use of her bilateral upper extremities in ADL/IADL tasks as evidenced by increased FOTO score of 15% by 9/25/24.   UPDATED 9/25/24: Patient will demonstrate improved functional use of her bilateral upper extremities in ADL/IADL tasks as evidenced by increased FOTO score of 15% by  10/16/24.   Patient will report 4/10 worst pain in bilateral shoulders during functional and therapeutic tasks by 9/25/24.   UPDATED 9/25/24: Patient will report 4/10 worst pain in bilateral shoulders during functional and therapeutic tasks by 10/16/24.   Patient will demonstrate 3 joint protection strategies to incorporate in work tasks to reduce pain and risk of injury by 9/25/24.   UPDATED 9/25/24: Patient will demonstrate 3 joint protection strategies to incorporate in work tasks to reduce pain and risk of injury by 10/16/24.   Patient will report minimal to no pain in performance of moderate impact household chores using bilateral upper extremities by 9/25/24.   UPDATED 9/25/24: Patient will report minimal to no pain in performance of moderate impact household chores using bilateral upper extremities by 10/16/24.   Patient will report minimal to no pain in styling her hair using bilateral upper extremities by 9/25/24.  UPDATED 9/25/24: Patient will report minimal to no pain in styling her hair using bilateral upper extremities by 10/16/24.    Plan     Updates/Grading for next session: progress weighted resistance as tolerated    Updated Certification Period: 8/14/24 to 10/30/24   Recommended Treatment Plan: 2 times per week for 3 weeks:  Electrical Stimulation, Manual Therapy, Moist Heat/ Ice, Neuromuscular Re-ed, Patient Education, Self Care, Therapeutic Activities, and Therapeutic Exercise  Other Recommendations:     Belle Evans OT   9/25/2024

## 2024-10-07 ENCOUNTER — CLINICAL SUPPORT (OUTPATIENT)
Dept: REHABILITATION | Facility: HOSPITAL | Age: 29
End: 2024-10-07
Payer: COMMERCIAL

## 2024-10-07 DIAGNOSIS — Z78.9 IMPAIRED ACTIVITIES OF DAILY LIVING: Primary | ICD-10-CM

## 2024-10-07 DIAGNOSIS — M25.511 BILATERAL SHOULDER PAIN, UNSPECIFIED CHRONICITY: ICD-10-CM

## 2024-10-07 DIAGNOSIS — M25.512 BILATERAL SHOULDER PAIN, UNSPECIFIED CHRONICITY: ICD-10-CM

## 2024-10-07 PROCEDURE — 97110 THERAPEUTIC EXERCISES: CPT | Mod: PN

## 2024-10-07 NOTE — PROGRESS NOTES
"[facility]  Occupational Therapy Treatment Note     Date: 10/7/2024  Name: Heide Dockery  Clinic Number: [id number]    Therapy Diagnosis: impaired activities of daily living ICD-10-CM Z78.9; bilateral shoulder pain, unspecified chronicity ICD-10-CM M25.511  Physician: Hermann Pradhan DO     Physician Orders: Eval and Treat  Medical Diagnosis:   M75.81,M75.82 (ICD-10-CM) - Tendinitis of both rotator cuffs   M75.21,M75.22 (ICD-10-CM) - Biceps tendinitis of both shoulders   M75.51,M75.52 (ICD-10-CM) - Subacromial bursitis of both shoulders      Surgical Procedure and Date: right shoulder arthroscopy with RTC, DCE, and biceps tenodesis  Evaluation Date: 8/14/2024  Insurance Authorization Period Expiration: 12/31/24  Plan of Care Certification Period: 8/14/24-10/30/24  Date of Return to MD: not yet scheduled; awaiting MRI  Visit # / Visits authorized: 6 / 20  FOTO: 2/ 3     Precautions:  Standard     Time In: 2:50 PM  Time Out: 3:32 PM  Total Billable Time: 42 minutes    Subjective     Patient reports: she was told to stop exercising her right shoulder pending MRI.  She was compliant with home exercise program given last session.   Response to previous treatment: "good."  Functional change: patient reported no functional changes since last session.    Pain: 5/10  Location: bilateral shoulders    Objective     Objective Measures updated at progress report unless specified.    Patient is awaiting MRI of the right shoulder and biceps to determine if she may require surgical intervention. Patient was told to continue therapy for her left shoulder as she has reported great relief of left shoulder pain since beginning therapy.    Treatment     Heide received the treatments listed below:      Heide received the following supervised modalities after being cleared for contradictions for  0 minutes:   IFC Electrical Stimulation:  Heide received IFC Electrical Stimulation for pain control " "applied to the right biceps and shoulder. Pt received stimulation at 100 % scan at a frequency of 5.0 volts for 10 minutes. Heide tolerated treatment well without any adverse effects.      Heide received the following direct contact modalities after being cleared for contraindications for  minutes:    manual therapy techniques: Joint mobilizations were applied to the: bilateral shoulders for 0 minutes, including:  OT performed passive range of motion left shoulder flexion for 1 minute hold, 2 repetitions.   OT performed passive range of motion left shoulder scaption for 1 minute hold, 2 repetitions.  OT performed passive range of motion left shoulder external rotation for 1 minute hold, 2 repetitions.   OT performed passive range of motion right shoulder flexion for 1 minute hold, 2 repetitions.   OT performed passive range of motion right shoulder scaption for 1 minute hold, 2 repetitions.  OT performed passive range of motion right shoulder external rotation for 1 minute hold, 2 repetitions.    therapeutic exercises to develop strength, endurance, ROM, and flexibility for 42 minutes including:  Patient performed left shoulder glenohumeral stability and strengthening to "write" the alphabet on the wall with shoulder flexed to 90 degrees, 1 repetition.  Patient performed left shoulder internal rotation using black theraband for 3 sets of 10 repetitions.  Patient performed left shoulder external rotation using black theraband for 3 sets of 10 repetitions.  Patient performed left shoulder rows using black theraband for 3 sets of 10 repetitions.  Patient performed left biceps curls using 5# for 3 sets of 10 repetitions.  Patient performed left shoulder extension using 5# for 3 sets of 10 repetitions.  Patient performed left shoulder horizontal internal rotation using 5# for 3 sets of 10 repetitions.  Patient performed left shoulder horizontal external rotation using 3# for 3 sets of 10 repetitions.  Patient " performed left shoulder flexion using 3# for 3 sets of 10 repetitions.  Patient performed left shoulder scaption using 3# for 3 sets of 10 repetitions.  Patient performed supine bench press using 5# for 3 sets of 10 repetitions.  Patient performed supine serratus punches using 5# for 3 sets of 10 repetitions.  Patient performed supine triceps extensions using 3# for 3 sets of 10 repetitions.  Patient performed supine flies using 3# for 3 sets of 10 repetitions.    neuromuscular re-education activities to improve:  for  minutes. The following activities were included:    therapeutic activities to improve functional performance for  minutes, including:    Patient Education and Home Exercises     Education provided:   - form in therapeutic exercises   - Progress towards goals     Written Home Exercises Provided: Pt instructed to continue prior HEP.  Exercises were reviewed and Heide was able to demonstrate them prior to the end of the session.  Heide demonstrated good  understanding of the home exercise program provided. See electronic medical record under Patient Instructions for exercises provided during therapy sessions.       Assessment     Patient was told to cease working right shoulder pending MRI; they are considering surgical intervention as patient's right shoulder pain has not decreased. Patient reported her left shoulder is improving with therapy. Continued therapeutic exercises for the left upper extremity only. Patient performed all therapeutic exercises well using cues from OT.    Heide is progressing well towards her goals. No updates. Pt prognosis is Good.     Patient will continue to benefit from skilled outpatient occupational therapy to address the deficits listed in the problem list on initial evaluation provide patient/family education and to maximize patient's level of independence in the home and community environment.     Patient's spiritual, cultural and educational needs considered and  patient agreeable to plan of care and goals.    Anticipated barriers to occupational therapy: none    Goals:  Short-term  Patient will demonstrate independence in home exercise program by 9/4/24. GOAL MET  Patient will report 6/10 worst pain in bilateral shoulders during functional and therapeutic tasks by 9/4/24.   UPDATED 9/13/24: Patient will report 6/10 worst pain in bilateral shoulders during functional and therapeutic tasks by 9/25/24.   UPDATED 9/25/24: Patient will report 6/10 worst pain in bilateral shoulders during functional and therapeutic tasks by 10/16/24. IN PROGRESS  Patient will report minimal to no pain in performance of low impact household chores using bilateral upper extremities by 9/4/24.   UPDATED 9/13/24: Patient will report minimal to no pain in performance of low impact household chores using bilateral upper extremities by 9/25/24.   UPDATED 9/25/24: Patient will report minimal to no pain in performance of low impact household chores using bilateral upper extremities by 10/16/24. IN PROGRESS  Patient will report no pain cutting her food using bilateral upper extremities by 9/4/24. GOAL MET  Long-term  Patient will demonstrate improved functional use of her bilateral upper extremities in ADL/IADL tasks as evidenced by increased FOTO score of 15% by 9/25/24.   UPDATED 9/25/24: Patient will demonstrate improved functional use of her bilateral upper extremities in ADL/IADL tasks as evidenced by increased FOTO score of 15% by 10/16/24. IN PROGRESS  Patient will report 4/10 worst pain in bilateral shoulders during functional and therapeutic tasks by 9/25/24.   UPDATED 9/25/24: Patient will report 4/10 worst pain in bilateral shoulders during functional and therapeutic tasks by 10/16/24. IN PROGRESS  Patient will demonstrate 3 joint protection strategies to incorporate in work tasks to reduce pain and risk of injury by 9/25/24.   UPDATED 9/25/24: Patient will demonstrate 3 joint protection  strategies to incorporate in work tasks to reduce pain and risk of injury by 10/16/24. IN PROGRESS  Patient will report minimal to no pain in performance of moderate impact household chores using bilateral upper extremities by 9/25/24.   UPDATED 9/25/24: Patient will report minimal to no pain in performance of moderate impact household chores using bilateral upper extremities by 10/16/24. IN PROGRESS  Patient will report minimal to no pain in styling her hair using bilateral upper extremities by 9/25/24.  UPDATED 9/25/24: Patient will report minimal to no pain in styling her hair using bilateral upper extremities by 10/16/24. IN PROGRESS    Plan     Updates/Grading for next session: progress weighted resistance as tolerated    [place] OT   10/7/2024

## 2024-12-05 ENCOUNTER — OFFICE VISIT (OUTPATIENT)
Dept: URGENT CARE | Facility: CLINIC | Age: 29
End: 2024-12-05
Payer: COMMERCIAL

## 2024-12-05 VITALS
BODY MASS INDEX: 22.5 KG/M2 | DIASTOLIC BLOOD PRESSURE: 64 MMHG | OXYGEN SATURATION: 99 % | HEART RATE: 64 BPM | SYSTOLIC BLOOD PRESSURE: 102 MMHG | TEMPERATURE: 98 F | HEIGHT: 66 IN | WEIGHT: 140 LBS | RESPIRATION RATE: 18 BRPM

## 2024-12-05 DIAGNOSIS — R09.81 NASAL CONGESTION: ICD-10-CM

## 2024-12-05 DIAGNOSIS — B97.89 VIRAL RESPIRATORY ILLNESS: Primary | ICD-10-CM

## 2024-12-05 DIAGNOSIS — R05.9 COUGH, UNSPECIFIED TYPE: ICD-10-CM

## 2024-12-05 DIAGNOSIS — J02.9 SORE THROAT: ICD-10-CM

## 2024-12-05 DIAGNOSIS — J98.8 VIRAL RESPIRATORY ILLNESS: Primary | ICD-10-CM

## 2024-12-05 LAB
CTP QC/QA: YES
MOLECULAR STREP A: NEGATIVE

## 2024-12-05 RX ORDER — PROMETHAZINE HYDROCHLORIDE AND DEXTROMETHORPHAN HYDROBROMIDE 6.25; 15 MG/5ML; MG/5ML
5 SYRUP ORAL EVERY 6 HOURS PRN
Qty: 120 ML | Refills: 0 | Status: SHIPPED | OUTPATIENT
Start: 2024-12-05

## 2024-12-05 RX ORDER — FLUTICASONE PROPIONATE 50 MCG
1 SPRAY, SUSPENSION (ML) NASAL DAILY
Qty: 9.9 ML | Refills: 0 | Status: SHIPPED | OUTPATIENT
Start: 2024-12-05

## 2024-12-05 RX ORDER — LORATADINE AND PSEUDOEPHEDRINE SULFATE 5; 120 MG/1; MG/1
1 TABLET, EXTENDED RELEASE ORAL 2 TIMES DAILY
COMMUNITY
Start: 2024-12-05 | End: 2024-12-15

## 2024-12-05 NOTE — PROGRESS NOTES
"Subjective:       Patient ID: Heide Dockery is a 29 y.o. female.    Vitals:  height is 5' 6" (1.676 m) and weight is 63.5 kg (140 lb). Her oral temperature is 98.1 °F (36.7 °C). Her blood pressure is 102/64 and her pulse is 64. Her respiration is 18 and oxygen saturation is 99%.     Chief Complaint: Sore Throat    This is a 29 y.o. female who presents today with a chief complaint of sore throat.  Woke yesterday with her throat a little irritated and this morning it was much worse.  Denies fever.  Taking cough drops with no relief. Pt denies any sob or chest pain  Patient presents with:  Sore Throat         Sore Throat   This is a new problem. The current episode started yesterday. The problem has been gradually worsening. There has been no fever. The pain is at a severity of 5/10. The pain is moderate. Associated symptoms include congestion and coughing. Treatments tried: cough drops. The treatment provided no relief.       Constitution: Negative.   HENT:  Positive for congestion, postnasal drip and sore throat.    Neck: neck negative.   Cardiovascular: Negative.    Eyes: Negative.    Respiratory:  Positive for cough.    Gastrointestinal: Negative.    Endocrine: negative.   Genitourinary: Negative.    Musculoskeletal: Negative.    Skin: Negative.    Allergic/Immunologic: Negative.    Neurological: Negative.    Hematologic/Lymphatic: Negative.    Psychiatric/Behavioral: Negative.             Objective:      Physical Exam   Constitutional: She is oriented to person, place, and time.   HENT:   Head: Normocephalic and atraumatic.   Ears:   Right Ear: Tympanic membrane, external ear and ear canal normal.   Left Ear: Tympanic membrane, external ear and ear canal normal.   Nose: Congestion present.   Mouth/Throat: Posterior oropharyngeal erythema present.   Eyes: Conjunctivae are normal. Pupils are equal, round, and reactive to light. Extraocular movement intact   Neck: Neck supple.   Cardiovascular: Normal rate, " regular rhythm, normal heart sounds and normal pulses.   Pulmonary/Chest: Effort normal and breath sounds normal.   Abdominal: Normal appearance.   Musculoskeletal: Normal range of motion.         General: Normal range of motion.   Neurological: no focal deficit. She is alert, oriented to person, place, and time and at baseline.   Skin: Skin is warm.   Psychiatric: Her behavior is normal. Mood, judgment and thought content normal.   Nursing note and vitals reviewed.        Past medical history and current medications reviewed.       Assessment:           1. Viral respiratory illness    2. Sore throat    3. Cough, unspecified type    4. Nasal congestion              Plan:         Viral respiratory illness  -     loratadine-pseudoephedrine 5-120 mg (CLARITIN-D 12 HOUR) 5-120 mg per tablet; Take 1 tablet by mouth 2 (two) times daily. for 10 days    Sore throat  -     POCT Strep A, Molecular  -     fluticasone propionate (FLONASE) 50 mcg/actuation nasal spray; 1 spray (50 mcg total) by Each Nostril route once daily.  Dispense: 9.9 mL; Refill: 0  -     loratadine-pseudoephedrine 5-120 mg (CLARITIN-D 12 HOUR) 5-120 mg per tablet; Take 1 tablet by mouth 2 (two) times daily. for 10 days    Cough, unspecified type  -     promethazine-dextromethorphan (PROMETHAZINE-DM) 6.25-15 mg/5 mL Syrp; Take 5 mLs by mouth every 6 (six) hours as needed (cough).  Dispense: 120 mL; Refill: 0  -     loratadine-pseudoephedrine 5-120 mg (CLARITIN-D 12 HOUR) 5-120 mg per tablet; Take 1 tablet by mouth 2 (two) times daily. for 10 days    Nasal congestion  -     fluticasone propionate (FLONASE) 50 mcg/actuation nasal spray; 1 spray (50 mcg total) by Each Nostril route once daily.  Dispense: 9.9 mL; Refill: 0  -     loratadine-pseudoephedrine 5-120 mg (CLARITIN-D 12 HOUR) 5-120 mg per tablet; Take 1 tablet by mouth 2 (two) times daily. for 10 days             There are no Patient Instructions on file for this visit.

## (undated) DEVICE — SUT 0 VICRYL / UR6 (J603)

## (undated) DEVICE — SUT CTD VICRYL 2-0 VIL BR C

## (undated) DEVICE — SET DECANTER MEDICHOICE

## (undated) DEVICE — SYR B-D DISP CONTROL 10CC100/C

## (undated) DEVICE — CLOSURE SKIN STERI STRIP 1/2X4

## (undated) DEVICE — Device

## (undated) DEVICE — SLEEVE SCD EXPRESS CALF MEDIUM

## (undated) DEVICE — UNDERGLOVES BIOGEL PI SZ 7 LF

## (undated) DEVICE — SEALER LIGASURE IMPACT 18CM

## (undated) DEVICE — CANISTER SUCTION 3000CC

## (undated) DEVICE — SUT CTD VICRYL 4-0 BR PS-2

## (undated) DEVICE — GLOVE SURG ULTRA TOUCH 7

## (undated) DEVICE — SEE MEDLINE ITEM 156964

## (undated) DEVICE — SOL IRRI STRL WATER 1000ML

## (undated) DEVICE — ELECTRODE BLD EXT 6.50 ST MDFD

## (undated) DEVICE — NDL ECLIPSE SAFETY 18GX1-1/2IN

## (undated) DEVICE — ELECTRODE REM PLYHSV RETURN 9

## (undated) DEVICE — LEGGING CLEAR POLY 2/PACK

## (undated) DEVICE — SEE MEDLINE ITEM 146292

## (undated) DEVICE — SUT CTD VICRYL 0 VIL BR/CT

## (undated) DEVICE — SYR 50ML CATH TIP

## (undated) DEVICE — SCRUB HIBICLENS 4% CHG 4OZ

## (undated) DEVICE — SUT 0 36IN COATED VICRYL VI

## (undated) DEVICE — DRESSING TRANS 4X4 TEGADERM

## (undated) DEVICE — GAUZE SPONGE 4X4 12PLY

## (undated) DEVICE — TUBING HEATED INSUFFLATOR

## (undated) DEVICE — GLOVE SURGEONS ULTRA TOUCH 6.5

## (undated) DEVICE — SEE MEDLINE ITEM 152622

## (undated) DEVICE — GLOVE PI ULTRA TOUCH G SURGEON

## (undated) DEVICE — GOWN B1 X-LG X-LONG

## (undated) DEVICE — DRAPE UNDER BUTTOCKS WITH POUCH

## (undated) DEVICE — CATH 16FR URETHRL RED RUB

## (undated) DEVICE — KIT SURGI-START 7695-SLA CUSTM

## (undated) DEVICE — GLOVE BIOGEL PI ORTHO PRO SZ7

## (undated) DEVICE — SEE MEDLINE ITEM 146417

## (undated) DEVICE — SPONGE NOVAPLUS LAP 18X18IN

## (undated) DEVICE — GLOVE SURG ULTRA TOUCH 8.5

## (undated) DEVICE — HEMOSTAT SURGICEL PWD 3G

## (undated) DEVICE — SYR BULB IRRIG ST 60 LF

## (undated) DEVICE — GLOVE BIOGEL PI ORTHO PRO 7.5

## (undated) DEVICE — TRAY DRY SKIN SCRUB PREP

## (undated) DEVICE — TROCAR ENDO Z THREAD KII 5X100

## (undated) DEVICE — SYR SLIP TIP 5CC

## (undated) DEVICE — NDL INSUF ULTRA VERESS 120MM

## (undated) DEVICE — PAD SANITARY OB STERILE

## (undated) DEVICE — SUT 0 8-18 IN CTD VICRYL

## (undated) DEVICE — GLOVE PROTEXIS PI SYN SURG 7

## (undated) DEVICE — SEE L#853

## (undated) DEVICE — SUT 2/0 27IN PLAIN GUT CT

## (undated) DEVICE — ADHESIVE MASTISOL VIAL 48/BX

## (undated) DEVICE — SEE MEDLINE ITEM 157148

## (undated) DEVICE — GAUZE SPONGE XRAY 4X4

## (undated) DEVICE — SOL NACL IRR 1000ML BTL

## (undated) DEVICE — COVER LIGHT HANDLE 80/CA

## (undated) DEVICE — KIT ENDO CARRY ON PROCEDURE